# Patient Record
Sex: FEMALE | Race: WHITE | NOT HISPANIC OR LATINO | Employment: UNEMPLOYED | URBAN - NONMETROPOLITAN AREA
[De-identification: names, ages, dates, MRNs, and addresses within clinical notes are randomized per-mention and may not be internally consistent; named-entity substitution may affect disease eponyms.]

---

## 2023-03-22 DIAGNOSIS — E78.5 HYPERLIPIDEMIA, UNSPECIFIED: ICD-10-CM

## 2023-03-24 RX ORDER — ROSUVASTATIN CALCIUM 5 MG/1
5 TABLET, COATED ORAL NIGHTLY
Qty: 90 TABLET | Refills: 1 | Status: SHIPPED | OUTPATIENT
Start: 2023-03-24 | End: 2023-07-11 | Stop reason: SDUPTHER

## 2023-03-24 RX ORDER — ROSUVASTATIN CALCIUM 5 MG/1
TABLET, COATED ORAL
Qty: 90 TABLET | Refills: 3 | Status: SHIPPED | OUTPATIENT
Start: 2023-03-24 | End: 2023-04-12 | Stop reason: SDUPTHER

## 2023-03-24 RX ORDER — ROSUVASTATIN CALCIUM 5 MG/1
5 TABLET, COATED ORAL NIGHTLY
COMMUNITY
End: 2023-03-24 | Stop reason: SDUPTHER

## 2023-04-11 ASSESSMENT — ENCOUNTER SYMPTOMS
SEIZURES: 0
WEIGHT LOSS: 0
FATIGUE: 0
SPEECH DIFFICULTY: 0
HUNGER: 0
BLURRED VISION: 0
POLYPHAGIA: 0
DIZZINESS: 0
HEADACHES: 0
POLYDIPSIA: 0
WEAKNESS: 0
VISUAL CHANGE: 1
CONFUSION: 0
BLACKOUTS: 0
TREMORS: 0
SWEATS: 0

## 2023-04-12 ENCOUNTER — OFFICE VISIT (OUTPATIENT)
Dept: PRIMARY CARE | Facility: CLINIC | Age: 63
End: 2023-04-12
Payer: COMMERCIAL

## 2023-04-12 VITALS
WEIGHT: 160.6 LBS | DIASTOLIC BLOOD PRESSURE: 80 MMHG | SYSTOLIC BLOOD PRESSURE: 144 MMHG | BODY MASS INDEX: 28.46 KG/M2 | TEMPERATURE: 97.2 F | HEIGHT: 63 IN | OXYGEN SATURATION: 94 % | RESPIRATION RATE: 18 BRPM | HEART RATE: 82 BPM

## 2023-04-12 DIAGNOSIS — Z00.00 HEALTH CARE MAINTENANCE: ICD-10-CM

## 2023-04-12 DIAGNOSIS — E11.69 TYPE 2 DIABETES MELLITUS WITH OTHER SPECIFIED COMPLICATION, WITH LONG-TERM CURRENT USE OF INSULIN (MULTI): ICD-10-CM

## 2023-04-12 DIAGNOSIS — I10 PRIMARY HYPERTENSION: ICD-10-CM

## 2023-04-12 DIAGNOSIS — Z85.828 HISTORY OF BASAL CELL CARCINOMA: ICD-10-CM

## 2023-04-12 DIAGNOSIS — E78.2 MODERATE MIXED HYPERLIPIDEMIA NOT REQUIRING STATIN THERAPY: Primary | ICD-10-CM

## 2023-04-12 DIAGNOSIS — Z79.4 TYPE 2 DIABETES MELLITUS WITH OTHER SPECIFIED COMPLICATION, WITH LONG-TERM CURRENT USE OF INSULIN (MULTI): ICD-10-CM

## 2023-04-12 PROBLEM — L98.9 LESION OF SKIN OF SCALP: Status: ACTIVE | Noted: 2023-04-12

## 2023-04-12 PROBLEM — L98.9 LESION OF SKIN OF SCALP: Status: RESOLVED | Noted: 2023-04-12 | Resolved: 2023-04-12

## 2023-04-12 PROBLEM — E78.5 HYPERLIPIDEMIA: Status: ACTIVE | Noted: 2023-04-12

## 2023-04-12 PROBLEM — M75.112 INCOMPLETE TEAR OF LEFT ROTATOR CUFF: Status: RESOLVED | Noted: 2023-04-12 | Resolved: 2023-04-12

## 2023-04-12 PROBLEM — R79.89 ABNORMAL TSH: Status: ACTIVE | Noted: 2023-04-12

## 2023-04-12 PROBLEM — J40 BRONCHITIS: Status: ACTIVE | Noted: 2023-04-12

## 2023-04-12 PROBLEM — L25.9 CONTACT DERMATITIS: Status: ACTIVE | Noted: 2023-04-12

## 2023-04-12 PROBLEM — L57.0 ACTINIC KERATOSES: Status: RESOLVED | Noted: 2023-04-12 | Resolved: 2023-04-12

## 2023-04-12 PROBLEM — T88.7XXA MEDICATION SIDE EFFECT: Status: ACTIVE | Noted: 2023-04-12

## 2023-04-12 PROBLEM — L57.0 ACTINIC KERATOSES: Status: ACTIVE | Noted: 2023-04-12

## 2023-04-12 PROBLEM — R21 RASH: Status: ACTIVE | Noted: 2023-04-12

## 2023-04-12 PROBLEM — R05.9 COUGH: Status: ACTIVE | Noted: 2023-04-12
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
## 2023-04-12 PROBLEM — E11.9 DMII (DIABETES MELLITUS, TYPE 2) (MULTI): Status: ACTIVE | Noted: 2023-04-12

## 2023-04-12 PROBLEM — F43.20 ADJUSTMENT REACTION OF ADULT LIFE: Status: RESOLVED | Noted: 2023-04-12 | Resolved: 2023-04-12

## 2023-04-12 PROBLEM — T88.7XXA MEDICATION SIDE EFFECT: Status: RESOLVED | Noted: 2023-04-12 | Resolved: 2023-04-12

## 2023-04-12 PROBLEM — R05.9 COUGH: Status: RESOLVED | Noted: 2023-04-12 | Resolved: 2023-04-12

## 2023-04-12 PROBLEM — F43.20 ADJUSTMENT REACTION OF ADULT LIFE: Status: ACTIVE | Noted: 2023-04-12

## 2023-04-12 PROBLEM — S49.92XD: Status: ACTIVE | Noted: 2023-04-12

## 2023-04-12 PROBLEM — R21 RASH: Status: RESOLVED | Noted: 2023-04-12 | Resolved: 2023-04-12

## 2023-04-12 PROBLEM — L25.9 CONTACT DERMATITIS: Status: RESOLVED | Noted: 2023-04-12 | Resolved: 2023-04-12

## 2023-04-12 PROBLEM — J40 BRONCHITIS: Status: RESOLVED | Noted: 2023-04-12 | Resolved: 2023-04-12

## 2023-04-12 PROBLEM — S49.92XD: Status: RESOLVED | Noted: 2023-04-12 | Resolved: 2023-04-12

## 2023-04-12 PROBLEM — M75.112 INCOMPLETE TEAR OF LEFT ROTATOR CUFF: Status: ACTIVE | Noted: 2023-04-12

## 2023-04-12 PROCEDURE — 83036 HEMOGLOBIN GLYCOSYLATED A1C: CPT

## 2023-04-12 PROCEDURE — 1036F TOBACCO NON-USER: CPT | Performed by: INTERNAL MEDICINE

## 2023-04-12 PROCEDURE — 99214 OFFICE O/P EST MOD 30 MIN: CPT | Performed by: INTERNAL MEDICINE

## 2023-04-12 PROCEDURE — 3077F SYST BP >= 140 MM HG: CPT | Performed by: INTERNAL MEDICINE

## 2023-04-12 PROCEDURE — 85025 COMPLETE CBC W/AUTO DIFF WBC: CPT

## 2023-04-12 PROCEDURE — 36415 COLL VENOUS BLD VENIPUNCTURE: CPT | Performed by: INTERNAL MEDICINE

## 2023-04-12 PROCEDURE — 80053 COMPREHEN METABOLIC PANEL: CPT

## 2023-04-12 PROCEDURE — 3079F DIAST BP 80-89 MM HG: CPT | Performed by: INTERNAL MEDICINE

## 2023-04-12 PROCEDURE — 80061 LIPID PANEL: CPT

## 2023-04-12 RX ORDER — LANCETS 33 GAUGE
EACH MISCELLANEOUS
COMMUNITY
Start: 2023-02-05 | End: 2023-10-02 | Stop reason: SDUPTHER

## 2023-04-12 RX ORDER — ISOPROPYL ALCOHOL 70 ML/100ML
SWAB TOPICAL
COMMUNITY
Start: 2021-01-12 | End: 2024-04-19 | Stop reason: ALTCHOICE

## 2023-04-12 RX ORDER — INSULIN HUMAN 100 [IU]/ML
INJECTION, SUSPENSION SUBCUTANEOUS 3 TIMES DAILY
COMMUNITY
Start: 2021-01-12 | End: 2023-10-03

## 2023-04-12 RX ORDER — EPINEPHRINE 0.22MG
AEROSOL WITH ADAPTER (ML) INHALATION
COMMUNITY
Start: 2020-07-23

## 2023-04-12 RX ORDER — FLASH GLUCOSE SENSOR
KIT MISCELLANEOUS
COMMUNITY
Start: 2023-04-04 | End: 2023-10-02 | Stop reason: SDUPTHER

## 2023-04-12 RX ORDER — INSULIN HUMAN 100 [IU]/ML
INJECTION, SUSPENSION SUBCUTANEOUS
COMMUNITY
End: 2023-10-02 | Stop reason: SDUPTHER

## 2023-04-12 RX ORDER — INSULIN GLARGINE 100 [IU]/ML
INJECTION, SOLUTION SUBCUTANEOUS
COMMUNITY
End: 2023-10-02 | Stop reason: SDUPTHER

## 2023-04-12 RX ORDER — CARVEDILOL 6.25 MG/1
1 TABLET ORAL
COMMUNITY
End: 2023-10-03 | Stop reason: SDUPTHER

## 2023-04-12 NOTE — ASSESSMENT & PLAN NOTE
- c/w carvedilol 6.25 mg BID  - had cardiology Jim Leggett, however unable to afford as patient has high deductible  - currently off aspirin

## 2023-04-12 NOTE — ASSESSMENT & PLAN NOTE
- c/w Humulin 70/30, taking 15-20 units BID, but advised to increase up to 2 units (up to 22 units)  on the biggest meal of the day (in the evening), as she   - c/w Lantus 24 units qhs  - follow up A1C  - last seen opthalmology in 11/2021, advised to follow up  - foot exam unremarkable at visit 4/12/23

## 2023-04-12 NOTE — PROGRESS NOTES
Subjective   Patient ID:   Leonie Wilkinson is a 62 y.o. female, history of DM II, HTN, HLD who presents for Med Refill (Wants A1c )    DM II  - on CGM (Free style Agustin)  - on Humulin 70/30, taking 15 to 20 units TID depending on the sugar, take 1 hour before meal  7 am, 11:30 and 4:30 pm  - on Lantus 24 units in the evening  - A1C at 8.3% on 9/2021  - last seen opthalmology in 10/2021, need to follow up , noted there is a growth on the eye   - foot exam unremarkable at last visit 2/1/21  - f/u Lynn Gil, last seen 4/2022  - unable to tolerate GLP-1 analog  - followed by Lynn Gil; as per Dr. Richardson, her BMI is 23 an concerning with insulin deficient state however have c-peptide of 1.2    HTN  - on carvedilol 6.25 mg BID  - used to see cardiology Jim Leggett, but unable to follow up secondary to high insurance deductible, switched insurance in 2022   - s/p stress test - 4/2021, was unremarkable, patient with CT cardiac scoring of intermediate; score of 257  - patient with gum bleeding with baby aspirin, currently not asa     HLD  - on Crestor 5 mg qd     Basal cell carcinoma, frontal head  - s/p removal   - currently due for follow up   - last seen 11/2022, follow up in 6 months from there     All other (10) organ systems have been reviewed, negative for significant complaint and no change from baseline other than mentioned as per HPI above.    Patient Active Problem List   Diagnosis    Abnormal TSH    Hyperlipidemia    Hypertension    DMII (diabetes mellitus, type 2) (CMS/[facility])    History of basal cell carcinoma    Health care maintenance        Past Surgical History:   Procedure Laterality Date    MOUTH SURGERY  06/20/2014    Oral Surgery Tooth Extraction    OTHER SURGICAL HISTORY  02/25/2019    Biceps tenodesis procedure    TONSILLECTOMY  06/20/2014    Tonsillectomy       Family History   Problem Relation Name Age of Onset    Stroke Mother      Other (cardiac disorder) Father          Social History    reports that she has never smoked. She has never used smokeless tobacco. She reports current drug use. Drug: Marijuana. No history on file for alcohol use.    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 Sig Documenting Provider Last Dose Status   alcohol swabs (Alcohol Prep Pads) pads, medicated [id number] Yes to be used to inject insulin daily Historical Provider, MD  Active   carvedilol (Coreg) 6.25 mg tablet [id number] Yes Take 1 tablet (6.25 mg) by mouth in the morning and 1 tablet (6.25 mg) in the evening. Take with meals. Historical Provider, MD  Active   coenzyme Q-10 100 mg capsule [id number] Yes Take by mouth. Historical Provider, MD  Active   FreeStyle Agustin 2 Sensor kit [id number] Yes Change sensor every 14 days as directed Historical MD Kaitlin  Active   HumuLIN 70/30 U-100 KwikPen 100 unit/mL (70-30) injection [id number] Yes INJECT 15 UNIT 3 times daily Historical Provider, MD  Active   insulin NPH, Isophane, (HumuLIN N NPH U-100 Insulin) 100 unit/mL injection [id number] Yes Inject under the skin 3 times a day. Historical Provider, MD  Active   Lantus Solostar U-100 Insulin 100 unit/mL (3 mL) pen [id number] Yes INJECT 24 UNITS SUBCUTANEOUSLY AT BEDTIME Historical Provider, MD  Active   NON FORMULARY [id number] Yes Take by mouth. PROBIATA ORAL Historical Provider, MD  Active     Discontinued 04/12/23 1406   rosuvastatin (Crestor) 5 mg tablet [id number] Yes Take 1 tablet (5 mg) by  "mouth once daily at bedtime. Clarke Melendrez MD  Active   Unifine Pentips Plus 32 gauge x 5/32\" needle [id number] Yes inject 6 (SIX) times a day Historical Provider, MD  Active                     Objective       5/11/2020     1:11 PM 7/23/2020     9:27 AM 2/1/2021    12:08 PM 7/8/2021     8:34 AM 3/9/2022     8:02 AM 8/8/2022     9:37 AM 4/12/2023    11:25 AM   Vitals   Systolic  132 142 130 128 144 144   Diastolic  82 72 72 78 88 80   Heart Rate  89 94 88 91 73 82   Temp 36.7 °C (98.1 °F) 36.2 °C (97.2 °F) 35.9 °C (96.6 °F) 35.8 °C (96.5 °F) 35.6 °C (96 °F) 36.6 °C (97.8 °F) 36.2 °C (97.2 °F)   Resp 20 18 18 18 18  18   Height (in) 1.6 m (5' 3\") 1.6 m (5' 3\") 1.6 m (5' 3\") 1.6 m (5' 3\")  1.6 m (5' 3\") 1.6 m (5' 3\")   Weight (lb)  131 139 148 158 160 160.6   BMI  23.21 kg/m2 24.62 kg/m2 26.22 kg/m2 27.99 kg/m2 28.34 kg/m2 28.45 kg/m2   BSA (m2)  1.62 m2 1.67 m2 1.73 m2 1.79 m2 1.8 m2 1.8 m2   Visit Report       Report     Physical Exam  Constitutional:       General: She is not in acute distress.     Appearance: Normal appearance. She is not ill-appearing or toxic-appearing.   HENT:      Head: Normocephalic.   Eyes:      Extraocular Movements: Extraocular movements intact.      Conjunctiva/sclera: Conjunctivae normal.   Cardiovascular:      Rate and Rhythm: Normal rate and regular rhythm.      Pulses:           Dorsalis pedis pulses are 2+ on the right side and 2+ on the left side.      Heart sounds: Normal heart sounds. No murmur heard.     No friction rub. No gallop.   Pulmonary:      Effort: Pulmonary effort is normal. No respiratory distress.      Breath sounds: Normal breath sounds. No stridor. No wheezing, rhonchi or rales.   Abdominal:      General: Abdomen is flat. There is no distension.      Palpations: Abdomen is soft.      Tenderness: There is no abdominal tenderness. There is no guarding.   Musculoskeletal:         General: Normal range of motion.      Right foot: Normal range of motion. No deformity. "      Left foot: Normal range of motion. No deformity.   Feet:      Right foot:      Protective Sensation: 10 sites tested.  10 sites sensed.      Skin integrity: Skin integrity normal. No ulcer, blister, skin breakdown, erythema or warmth.      Left foot:      Protective Sensation: 10 sites tested.  10 sites sensed.      Skin integrity: Skin integrity normal. No ulcer, blister, skin breakdown, erythema or warmth.      Comments: Diabetic foot exam, good bilateral dorsalis pedis pulse, no lesion, sensory 10/10 on monofilament exam bilaterally    Skin:     General: Skin is warm and dry.   Neurological:      General: No focal deficit present.      Mental Status: She is alert. Mental status is at baseline.         Assessment/Plan     Leonie Wilkinson is a 62 y.o. female, history of DM II, HTN, HLD who presents for Med Refill (Wants A1c )    Problem List Items Addressed This Visit       Hyperlipidemia - Primary     - c/w Crestor 5 mg qd          Relevant Orders    Lipid Panel    Hypertension     - c/w carvedilol 6.25 mg BID  - had cardiology Jim Leggett, however unable to afford as patient has high deductible  - currently off aspirin         Relevant Orders    Comprehensive Metabolic Panel    CBC and Auto Differential    DMII (diabetes mellitus, type 2) (CMS/[facility])     - c/w Humulin 70/30, taking 15-20 units BID, but advised to increase up to 2 units (up to 22 units)  on the biggest meal of the day (in the evening), as she   - c/w Lantus 24 units qhs  - A1C slighty worsening as above, c/w current medication, - f/u Lynn Gil  - last seen opthalmology in 11/2021, advised to follow up  - foot exam unremarkable at visit 4/12/23         Relevant Orders    Hemoglobin A1c    History of basal cell carcinoma     - advised to follow up with [facility] maintenance     3/9/22  Aspirin for primary/secondary prophylaxis: stopped as patient with gum bleeding  Diabetes Screening/monitoring: A1C as  above  Lipid screening: odered  STD/HIV Screening: declined   Last Mammogram: last in 4/2021, ordered on prior visit , patient wanted to wait until 10/1/23, as currently patient is self pay   Last Pap smear: advised to follow up with gynecologist , last pap smear was more than 5 years ago   Lung Cancer Screening: never smoke, not indicated  Hepatitis C Screening: negative 2/2021  DEXA Scan : not due yet   Colonoscopy: last 11/2018, found polyp (4 polyps removed), next due 11/2021, ordered on prior visit, however patient would like to wait until 10/23 as patient will get medicare  Vaccination s/p 3x COVID, advised to be bivalent booster, s/p 2x shingles, last Tdap 2015, s/p PPSV23, out of season for flu          Follow up in 3 to 6 months pending A1C result   No future appointments.

## 2023-04-12 NOTE — ASSESSMENT & PLAN NOTE
3/9/22  Aspirin for primary/secondary prophylaxis: stopped as patient with gum bleeding  Diabetes Screening/monitoring: A1C as above  Lipid screening: odered  STD/HIV Screening: declined   Last Mammogram: last in 4/2021, ordered on prior visit , patient wanted to wait until 10/1/23, as currently patient is self pay   Last Pap smear: advised to follow up with gynecologist , last pap smear was more than 5 years ago   Lung Cancer Screening: never smoke, not indicated  Hepatitis C Screening: negative 2/2021  DEXA Scan : not due yet   Colonoscopy: last 11/2018, found polyp (4 polyps removed), next due 11/2021, ordered on prior visit, however patient would like to wait until 10/23 as patient will get medicare  Vaccination s/p 3x COVID, advised to be bivalent booster, s/p 2x shingles, last Tdap 2015, s/p PPSV23, out of season for flu

## 2023-04-13 LAB
ALANINE AMINOTRANSFERASE (SGPT) (U/L) IN SER/PLAS: 19 U/L (ref 7–45)
ALBUMIN (G/DL) IN SER/PLAS: 4.6 G/DL (ref 3.4–5)
ALKALINE PHOSPHATASE (U/L) IN SER/PLAS: 61 U/L (ref 33–136)
ANION GAP IN SER/PLAS: 12 MMOL/L (ref 10–20)
ASPARTATE AMINOTRANSFERASE (SGOT) (U/L) IN SER/PLAS: 15 U/L (ref 9–39)
BASOPHILS (10*3/UL) IN BLOOD BY AUTOMATED COUNT: 0.05 X10E9/L (ref 0–0.1)
BASOPHILS/100 LEUKOCYTES IN BLOOD BY AUTOMATED COUNT: 0.6 % (ref 0–2)
BILIRUBIN TOTAL (MG/DL) IN SER/PLAS: 0.7 MG/DL (ref 0–1.2)
CALCIUM (MG/DL) IN SER/PLAS: 10.2 MG/DL (ref 8.6–10.6)
CARBON DIOXIDE, TOTAL (MMOL/L) IN SER/PLAS: 28 MMOL/L (ref 21–32)
CHLORIDE (MMOL/L) IN SER/PLAS: 102 MMOL/L (ref 98–107)
CHOLESTEROL (MG/DL) IN SER/PLAS: 170 MG/DL (ref 0–199)
CHOLESTEROL IN HDL (MG/DL) IN SER/PLAS: 47.8 MG/DL
CHOLESTEROL/HDL RATIO: 3.6
CREATININE (MG/DL) IN SER/PLAS: 0.74 MG/DL (ref 0.5–1.05)
EOSINOPHILS (10*3/UL) IN BLOOD BY AUTOMATED COUNT: 0.12 X10E9/L (ref 0–0.7)
EOSINOPHILS/100 LEUKOCYTES IN BLOOD BY AUTOMATED COUNT: 1.5 % (ref 0–6)
ERYTHROCYTE DISTRIBUTION WIDTH (RATIO) BY AUTOMATED COUNT: 13.2 % (ref 11.5–14.5)
ERYTHROCYTE MEAN CORPUSCULAR HEMOGLOBIN CONCENTRATION (G/DL) BY AUTOMATED: 31.9 G/DL (ref 32–36)
ERYTHROCYTE MEAN CORPUSCULAR VOLUME (FL) BY AUTOMATED COUNT: 88 FL (ref 80–100)
ERYTHROCYTES (10*6/UL) IN BLOOD BY AUTOMATED COUNT: 5.21 X10E12/L (ref 4–5.2)
ESTIMATED AVERAGE GLUCOSE FOR HBA1C: 169 MG/DL
GFR FEMALE: 90 ML/MIN/1.73M2
GLUCOSE (MG/DL) IN SER/PLAS: 153 MG/DL (ref 74–99)
HEMATOCRIT (%) IN BLOOD BY AUTOMATED COUNT: 45.7 % (ref 36–46)
HEMOGLOBIN (G/DL) IN BLOOD: 14.6 G/DL (ref 12–16)
HEMOGLOBIN A1C/HEMOGLOBIN TOTAL IN BLOOD: 7.5 %
IMMATURE GRANULOCYTES/100 LEUKOCYTES IN BLOOD BY AUTOMATED COUNT: 0.2 % (ref 0–0.9)
LDL: 76 MG/DL (ref 0–99)
LEUKOCYTES (10*3/UL) IN BLOOD BY AUTOMATED COUNT: 8.2 X10E9/L (ref 4.4–11.3)
LYMPHOCYTES (10*3/UL) IN BLOOD BY AUTOMATED COUNT: 2.67 X10E9/L (ref 1.2–4.8)
LYMPHOCYTES/100 LEUKOCYTES IN BLOOD BY AUTOMATED COUNT: 32.8 % (ref 13–44)
MONOCYTES (10*3/UL) IN BLOOD BY AUTOMATED COUNT: 0.42 X10E9/L (ref 0.1–1)
MONOCYTES/100 LEUKOCYTES IN BLOOD BY AUTOMATED COUNT: 5.2 % (ref 2–10)
NEUTROPHILS (10*3/UL) IN BLOOD BY AUTOMATED COUNT: 4.87 X10E9/L (ref 1.2–7.7)
NEUTROPHILS/100 LEUKOCYTES IN BLOOD BY AUTOMATED COUNT: 59.7 % (ref 40–80)
NON HDL CHOLESTEROL: 122 MG/DL
PLATELETS (10*3/UL) IN BLOOD AUTOMATED COUNT: 212 X10E9/L (ref 150–450)
POTASSIUM (MMOL/L) IN SER/PLAS: 4.3 MMOL/L (ref 3.5–5.3)
PROTEIN TOTAL: 7.8 G/DL (ref 6.4–8.2)
SODIUM (MMOL/L) IN SER/PLAS: 138 MMOL/L (ref 136–145)
TRIGLYCERIDE (MG/DL) IN SER/PLAS: 229 MG/DL (ref 0–149)
UREA NITROGEN (MG/DL) IN SER/PLAS: 9 MG/DL (ref 6–23)
VLDL: 46 MG/DL (ref 0–40)

## 2023-07-11 DIAGNOSIS — E78.5 HYPERLIPIDEMIA, UNSPECIFIED: ICD-10-CM

## 2023-07-16 RX ORDER — ROSUVASTATIN CALCIUM 5 MG/1
5 TABLET, COATED ORAL NIGHTLY
Qty: 90 TABLET | Refills: 3 | Status: SHIPPED | OUTPATIENT
Start: 2023-07-16 | End: 2023-10-03 | Stop reason: SDUPTHER

## 2023-10-02 DIAGNOSIS — E11.649 UNCONTROLLED TYPE 2 DIABETES MELLITUS WITH HYPOGLYCEMIA WITHOUT COMA (MULTI): Primary | ICD-10-CM

## 2023-10-02 RX ORDER — INSULIN HUMAN 100 [IU]/ML
INJECTION, SUSPENSION SUBCUTANEOUS
Qty: 3 ML | Refills: 11 | Status: SHIPPED | OUTPATIENT
Start: 2023-10-02 | End: 2024-02-15 | Stop reason: SDUPTHER

## 2023-10-02 RX ORDER — INSULIN GLARGINE 100 [IU]/ML
INJECTION, SOLUTION SUBCUTANEOUS
Qty: 3 ML | Refills: 11 | Status: SHIPPED | OUTPATIENT
Start: 2023-10-02

## 2023-10-02 RX ORDER — BLOOD SUGAR DIAGNOSTIC
1 STRIP MISCELLANEOUS AS NEEDED
Qty: 50 STRIP | Refills: 3 | Status: SHIPPED | OUTPATIENT
Start: 2023-10-02

## 2023-10-02 RX ORDER — BLOOD SUGAR DIAGNOSTIC
1 STRIP MISCELLANEOUS AS NEEDED
COMMUNITY
End: 2023-10-02 | Stop reason: SDUPTHER

## 2023-10-02 RX ORDER — FLASH GLUCOSE SENSOR
KIT MISCELLANEOUS
Qty: 1 EACH | Refills: 11 | Status: SHIPPED | OUTPATIENT
Start: 2023-10-02

## 2023-10-02 RX ORDER — LANCETS 33 GAUGE
EACH MISCELLANEOUS
Qty: 180 EACH | Refills: 11 | Status: SHIPPED | OUTPATIENT
Start: 2023-10-02

## 2023-10-02 NOTE — TELEPHONE ENCOUNTER
Spoke to Leonie Wilkinson  Scheduled patient for yearly follow up 12/07/2023. Patient requested refills . Refill request setup and pended to physician. Karon Hightower LPN

## 2023-10-02 NOTE — TELEPHONE ENCOUNTER
Spoke to Leonie Wilkinson concerning new insurance and scheduling yearly follow up for 12/07/2023 . Patient requesting refills of Humulin 70/30 , sheldon sensor ,lantus and pen needles. Requesting new script for Freestyle sheldon test strips for when sensors malfunction . Refill request send and pended to physician .  Karon Hightower LPN

## 2023-10-03 ENCOUNTER — OFFICE VISIT (OUTPATIENT)
Dept: PRIMARY CARE | Facility: CLINIC | Age: 63
End: 2023-10-03
Payer: MEDICARE

## 2023-10-03 VITALS
TEMPERATURE: 96.1 F | SYSTOLIC BLOOD PRESSURE: 172 MMHG | DIASTOLIC BLOOD PRESSURE: 92 MMHG | HEART RATE: 82 BPM | HEIGHT: 62 IN | WEIGHT: 158 LBS | BODY MASS INDEX: 29.08 KG/M2 | OXYGEN SATURATION: 95 %

## 2023-10-03 DIAGNOSIS — E78.5 HYPERLIPIDEMIA, UNSPECIFIED: ICD-10-CM

## 2023-10-03 DIAGNOSIS — Z23 NEED FOR VACCINATION: ICD-10-CM

## 2023-10-03 DIAGNOSIS — Z12.11 SCREENING FOR COLORECTAL CANCER: ICD-10-CM

## 2023-10-03 DIAGNOSIS — Z12.12 SCREENING FOR COLORECTAL CANCER: ICD-10-CM

## 2023-10-03 DIAGNOSIS — Z12.31 ENCOUNTER FOR SCREENING MAMMOGRAM FOR BREAST CANCER: ICD-10-CM

## 2023-10-03 DIAGNOSIS — I10 PRIMARY HYPERTENSION: ICD-10-CM

## 2023-10-03 DIAGNOSIS — Z00.00 ROUTINE GENERAL MEDICAL EXAMINATION AT HEALTH CARE FACILITY: Primary | ICD-10-CM

## 2023-10-03 PROCEDURE — 3080F DIAST BP >= 90 MM HG: CPT | Performed by: FAMILY MEDICINE

## 2023-10-03 PROCEDURE — 3077F SYST BP >= 140 MM HG: CPT | Performed by: FAMILY MEDICINE

## 2023-10-03 PROCEDURE — 1036F TOBACCO NON-USER: CPT | Performed by: FAMILY MEDICINE

## 2023-10-03 PROCEDURE — 3051F HG A1C>EQUAL 7.0%<8.0%: CPT | Performed by: FAMILY MEDICINE

## 2023-10-03 PROCEDURE — G0008 ADMIN INFLUENZA VIRUS VAC: HCPCS | Performed by: FAMILY MEDICINE

## 2023-10-03 PROCEDURE — G0402 INITIAL PREVENTIVE EXAM: HCPCS | Performed by: FAMILY MEDICINE

## 2023-10-03 PROCEDURE — 90662 IIV NO PRSV INCREASED AG IM: CPT | Performed by: FAMILY MEDICINE

## 2023-10-03 RX ORDER — CARVEDILOL 6.25 MG/1
6.25 TABLET ORAL
Qty: 180 TABLET | Refills: 3 | Status: SHIPPED | OUTPATIENT
Start: 2023-10-03

## 2023-10-03 RX ORDER — BIOTIN 10 MG
TABLET ORAL
COMMUNITY

## 2023-10-03 RX ORDER — ROSUVASTATIN CALCIUM 5 MG/1
5 TABLET, COATED ORAL NIGHTLY
Qty: 90 TABLET | Refills: 3 | Status: SHIPPED | OUTPATIENT
Start: 2023-10-03 | End: 2024-10-02

## 2023-10-03 ASSESSMENT — ENCOUNTER SYMPTOMS
APPETITE CHANGE: 0
COUGH: 0
HEMATURIA: 0
DEPRESSION: 0
ARTHRALGIAS: 0
DIARRHEA: 0
SHORTNESS OF BREATH: 0
SLEEP DISTURBANCE: 0
MYALGIAS: 0
EYE ITCHING: 0
POLYPHAGIA: 1
FLANK PAIN: 0
JOINT SWELLING: 0
FEVER: 0
RHINORRHEA: 0
BLOOD IN STOOL: 0
PALPITATIONS: 0
DIZZINESS: 0
HEADACHES: 0
SORE THROAT: 0
CONSTIPATION: 0
OCCASIONAL FEELINGS OF UNSTEADINESS: 0
DYSURIA: 0
LOSS OF SENSATION IN FEET: 0
NUMBNESS: 0
VOMITING: 0
UNEXPECTED WEIGHT CHANGE: 0
LIGHT-HEADEDNESS: 0
WEAKNESS: 0
EYE DISCHARGE: 0
WHEEZING: 0
ACTIVITY CHANGE: 0
DYSPHORIC MOOD: 0
ABDOMINAL PAIN: 0
NAUSEA: 0
SINUS PRESSURE: 0
NERVOUS/ANXIOUS: 0

## 2023-10-03 ASSESSMENT — ACTIVITIES OF DAILY LIVING (ADL)
GROCERY_SHOPPING: INDEPENDENT
TAKING_MEDICATION: INDEPENDENT
DOING_HOUSEWORK: INDEPENDENT
BATHING: INDEPENDENT
MANAGING_FINANCES: INDEPENDENT
DRESSING: INDEPENDENT

## 2023-10-03 ASSESSMENT — VISUAL ACUITY
OS_CC: 20/20
OD_CC: 20/15

## 2023-10-03 ASSESSMENT — PATIENT HEALTH QUESTIONNAIRE - PHQ9
2. FEELING DOWN, DEPRESSED OR HOPELESS: NOT AT ALL
SUM OF ALL RESPONSES TO PHQ9 QUESTIONS 1 AND 2: 0
1. LITTLE INTEREST OR PLEASURE IN DOING THINGS: NOT AT ALL

## 2023-10-03 NOTE — PROGRESS NOTES
"Subjective   Reason for Visit: Leonie Wilkinson is an 62 y.o. female here for a Medicare Wellness visit.     Past Medical, Surgical, and Family History reviewed and updated in chart.    Reviewed all medications by prescribing practitioner or clinical pharmacist (such as prescriptions, OTCs, herbal therapies and supplements) and documented in the medical record.    HPI HERE FOR HER WELLNESS   WOULD LIKE NOT TO TAKE THE CRESTOR   ADVISED TAKING IT   DISCUSS WITH HER CARDIOLOGIST   SHE SEES ENDOCRINE     Patient Care Team:  Daysi Davidsno DO as PCP - General (Family Medicine)     Review of Systems   Constitutional:  Negative for activity change, appetite change, fever and unexpected weight change.   HENT:  Negative for congestion, ear pain, postnasal drip, rhinorrhea, sinus pressure and sore throat.    Eyes:  Negative for discharge, itching and visual disturbance.   Respiratory:  Negative for cough, shortness of breath and wheezing.    Cardiovascular:  Negative for chest pain, palpitations and leg swelling.   Gastrointestinal:  Negative for abdominal pain, blood in stool, constipation, diarrhea, nausea and vomiting.   Endocrine: Positive for polyphagia. Negative for cold intolerance, heat intolerance and polyuria.   Genitourinary:  Negative for dysuria, flank pain and hematuria.   Musculoskeletal:  Negative for arthralgias, gait problem, joint swelling and myalgias.   Skin:  Negative for rash.   Allergic/Immunologic: Negative for environmental allergies and food allergies.   Neurological:  Negative for dizziness, syncope, weakness, light-headedness, numbness and headaches.   Psychiatric/Behavioral:  Negative for dysphoric mood and sleep disturbance. The patient is not nervous/anxious.        Objective   Vitals:  BP (!) 172/92   Pulse 82   Temp 35.6 °C (96.1 °F)   Ht 1.581 m (5' 2.25\")   Wt 71.7 kg (158 lb)   SpO2 95%   BMI 28.67 kg/m²       Physical Exam  Vitals and nursing note reviewed.   Constitutional:  "      Appearance: Normal appearance.   HENT:      Head: Normocephalic.      Mouth/Throat:      Mouth: Mucous membranes are moist.   Cardiovascular:      Rate and Rhythm: Normal rate and regular rhythm.      Pulses: Normal pulses.      Heart sounds: Normal heart sounds. No murmur heard.     No friction rub. No gallop.   Pulmonary:      Effort: Pulmonary effort is normal. No respiratory distress.      Breath sounds: Normal breath sounds. No wheezing.   Abdominal:      General: Bowel sounds are normal. There is no distension.      Palpations: Abdomen is soft.      Tenderness: There is no abdominal tenderness.   Musculoskeletal:         General: No deformity. Normal range of motion.        Feet:    Feet:      Right foot:      Skin integrity: Callus present.   Skin:     General: Skin is warm and dry.      Capillary Refill: Capillary refill takes less than 2 seconds.   Neurological:      General: No focal deficit present.      Mental Status: She is alert and oriented to person, place, and time.   Psychiatric:         Mood and Affect: Mood normal.         Assessment/Plan   Problem List Items Addressed This Visit       Hypertension    Relevant Medications    carvedilol (Coreg) 6.25 mg tablet     Other Visit Diagnoses       Routine general medical examination at health care facility    -  Primary    Need for vaccination        Relevant Orders    Flu vaccine, high dose seasonal, preservative free    Encounter for screening mammogram for breast cancer        Relevant Orders    BI mammo bilateral screening tomosynthesis    Screening for colorectal cancer        Relevant Orders    Referral to Gastroenterology    Hyperlipidemia, unspecified        Relevant Medications    rosuvastatin (Crestor) 5 mg tablet

## 2023-10-06 ENCOUNTER — TELEPHONE (OUTPATIENT)
Dept: SURGERY | Facility: CLINIC | Age: 63
End: 2023-10-06
Payer: COMMERCIAL

## 2023-10-07 DIAGNOSIS — Z12.11 SCREEN FOR COLON CANCER: Primary | ICD-10-CM

## 2023-10-09 ENCOUNTER — TELEPHONE (OUTPATIENT)
Dept: ENDOCRINOLOGY | Facility: CLINIC | Age: 63
End: 2023-10-09
Payer: COMMERCIAL

## 2023-10-09 NOTE — TELEPHONE ENCOUNTER
Spoke with Leonie Wilkinson verifying her insurance for prior authorization . Patient verified new insurance . Patient waiting to find out from pharmacy if CGM is a medical benefit to be supplied by a DME or a pharmacy benefit . Call made to pharmacy and message left for pharmacist working on CGM coverage to call office . Karon Hightower LPN

## 2023-11-09 ENCOUNTER — CLINICAL SUPPORT (OUTPATIENT)
Dept: PRIMARY CARE | Facility: CLINIC | Age: 63
End: 2023-11-09
Payer: MEDICARE

## 2023-11-09 DIAGNOSIS — I10 HYPERTENSION, UNSPECIFIED TYPE: ICD-10-CM

## 2023-11-09 DIAGNOSIS — E78.5 HYPERLIPIDEMIA, UNSPECIFIED HYPERLIPIDEMIA TYPE: ICD-10-CM

## 2023-11-09 DIAGNOSIS — E11.69 TYPE 2 DIABETES MELLITUS WITH OTHER SPECIFIED COMPLICATION, WITH LONG-TERM CURRENT USE OF INSULIN (MULTI): ICD-10-CM

## 2023-11-09 DIAGNOSIS — Z79.4 TYPE 2 DIABETES MELLITUS WITH OTHER SPECIFIED COMPLICATION, WITH LONG-TERM CURRENT USE OF INSULIN (MULTI): ICD-10-CM

## 2023-11-09 PROCEDURE — 80061 LIPID PANEL: CPT

## 2023-11-09 PROCEDURE — 83036 HEMOGLOBIN GLYCOSYLATED A1C: CPT

## 2023-11-09 PROCEDURE — 80053 COMPREHEN METABOLIC PANEL: CPT

## 2023-11-09 PROCEDURE — 36415 COLL VENOUS BLD VENIPUNCTURE: CPT

## 2023-11-10 ENCOUNTER — HOSPITAL ENCOUNTER (OUTPATIENT)
Dept: RADIOLOGY | Facility: HOSPITAL | Age: 63
Discharge: HOME | End: 2023-11-10
Payer: MEDICARE

## 2023-11-10 DIAGNOSIS — Z12.31 ENCOUNTER FOR SCREENING MAMMOGRAM FOR BREAST CANCER: ICD-10-CM

## 2023-11-10 LAB
ALBUMIN SERPL BCP-MCNC: 4.6 G/DL (ref 3.4–5)
ALP SERPL-CCNC: 55 U/L (ref 33–136)
ALT SERPL W P-5'-P-CCNC: 13 U/L (ref 7–45)
ANION GAP SERPL CALC-SCNC: 14 MMOL/L (ref 10–20)
AST SERPL W P-5'-P-CCNC: 15 U/L (ref 9–39)
BILIRUB SERPL-MCNC: 0.6 MG/DL (ref 0–1.2)
BUN SERPL-MCNC: 8 MG/DL (ref 6–23)
CALCIUM SERPL-MCNC: 9.6 MG/DL (ref 8.6–10.6)
CHLORIDE SERPL-SCNC: 101 MMOL/L (ref 98–107)
CHOLEST SERPL-MCNC: 175 MG/DL (ref 0–199)
CHOLESTEROL/HDL RATIO: 4
CO2 SERPL-SCNC: 26 MMOL/L (ref 21–32)
CREAT SERPL-MCNC: 0.71 MG/DL (ref 0.5–1.05)
EST. AVERAGE GLUCOSE BLD GHB EST-MCNC: 163 MG/DL
GFR SERPL CREATININE-BSD FRML MDRD: >90 ML/MIN/1.73M*2
GLUCOSE SERPL-MCNC: 128 MG/DL (ref 74–99)
HBA1C MFR BLD: 7.3 %
HDLC SERPL-MCNC: 43.6 MG/DL
LDLC SERPL CALC-MCNC: 96 MG/DL
NON HDL CHOLESTEROL: 131 MG/DL (ref 0–149)
POTASSIUM SERPL-SCNC: 4.7 MMOL/L (ref 3.5–5.3)
PROT SERPL-MCNC: 8 G/DL (ref 6.4–8.2)
SODIUM SERPL-SCNC: 136 MMOL/L (ref 136–145)
TRIGL SERPL-MCNC: 178 MG/DL (ref 0–149)
VLDL: 36 MG/DL (ref 0–40)

## 2023-11-10 PROCEDURE — 77067 SCR MAMMO BI INCL CAD: CPT

## 2023-11-10 PROCEDURE — 77063 BREAST TOMOSYNTHESIS BI: CPT | Mod: BILATERAL PROCEDURE | Performed by: RADIOLOGY

## 2023-11-10 PROCEDURE — 77067 SCR MAMMO BI INCL CAD: CPT | Mod: BILATERAL PROCEDURE | Performed by: RADIOLOGY

## 2023-11-13 ENCOUNTER — PROCEDURE VISIT (OUTPATIENT)
Dept: PRIMARY CARE | Facility: CLINIC | Age: 63
End: 2023-11-13
Payer: MEDICARE

## 2023-11-13 VITALS
HEIGHT: 62 IN | BODY MASS INDEX: 30 KG/M2 | HEART RATE: 68 BPM | WEIGHT: 163 LBS | DIASTOLIC BLOOD PRESSURE: 92 MMHG | TEMPERATURE: 96.3 F | OXYGEN SATURATION: 96 % | SYSTOLIC BLOOD PRESSURE: 150 MMHG

## 2023-11-13 DIAGNOSIS — Z12.4 ENCOUNTER FOR PAPANICOLAOU SMEAR FOR CERVICAL CANCER SCREENING: ICD-10-CM

## 2023-11-13 PROCEDURE — 99397 PER PM REEVAL EST PAT 65+ YR: CPT | Performed by: FAMILY MEDICINE

## 2023-11-13 PROCEDURE — 88175 CYTOPATH C/V AUTO FLUID REDO: CPT

## 2023-11-13 PROCEDURE — 87624 HPV HI-RISK TYP POOLED RSLT: CPT

## 2023-11-13 ASSESSMENT — ENCOUNTER SYMPTOMS
NUMBNESS: 0
HEADACHES: 0
ABDOMINAL PAIN: 0
BLOOD IN STOOL: 0
JOINT SWELLING: 0
DIZZINESS: 0
COUGH: 0
VOMITING: 0
NERVOUS/ANXIOUS: 0
FEVER: 0
WEAKNESS: 0
ARTHRALGIAS: 0
EYE ITCHING: 0
DYSPHORIC MOOD: 0
RHINORRHEA: 0
APPETITE CHANGE: 0
SINUS PRESSURE: 0
CONSTIPATION: 0
MYALGIAS: 0
HEMATURIA: 0
NAUSEA: 0
FLANK PAIN: 0
LIGHT-HEADEDNESS: 0
SLEEP DISTURBANCE: 0
PALPITATIONS: 0
WHEEZING: 0
SHORTNESS OF BREATH: 0
DIARRHEA: 0
ACTIVITY CHANGE: 0
UNEXPECTED WEIGHT CHANGE: 0
EYE DISCHARGE: 0
SORE THROAT: 0
DYSURIA: 0

## 2023-11-13 NOTE — PATIENT INSTRUCTIONS
PAP IS TAKEN   THE OFFICE WILL CALL WITH THE RESULTS /MY CHART   FOLLOW WITH ENDOCRINE  6 MONTHS FOR  A LIPID PANEL AND GENERAL CHEMISTRIES   CONTINUE GREAT EFFORTS WITH LOWER FAT AND SUGARS AND CARBOHYDRATES IN THE DIET   DAILY EXERCISE

## 2023-11-13 NOTE — PROGRESS NOTES
"Subjective   Patient ID: Leonie Wilkinson is a 63 y.o. female who presents for Gynecologic Exam (Has not had a PAP in \"a long time\".).    Gynecologic Exam  Pertinent negatives include no abdominal pain, constipation, diarrhea, dysuria, fever, flank pain, headaches, hematuria, nausea, rash, sore throat or vomiting.    NO BLEEDING OR SPOTTING   MENOPAUSAL AGE AGE 50  NULLIPAROUS     Review of Systems   Constitutional:  Negative for activity change, appetite change, fever and unexpected weight change.   HENT:  Negative for congestion, ear pain, postnasal drip, rhinorrhea, sinus pressure and sore throat.    Eyes:  Negative for discharge, itching and visual disturbance.   Respiratory:  Negative for cough, shortness of breath and wheezing.    Cardiovascular:  Negative for chest pain, palpitations and leg swelling.   Gastrointestinal:  Negative for abdominal pain, blood in stool, constipation, diarrhea, nausea and vomiting.   Endocrine: Negative for cold intolerance, heat intolerance and polyuria.   Genitourinary:  Negative for dysuria, flank pain and hematuria.   Musculoskeletal:  Negative for arthralgias, gait problem, joint swelling and myalgias.   Skin:  Negative for rash.   Allergic/Immunologic: Negative for environmental allergies and food allergies.   Neurological:  Negative for dizziness, syncope, weakness, light-headedness, numbness and headaches.   Psychiatric/Behavioral:  Negative for dysphoric mood and sleep disturbance. The patient is not nervous/anxious.        Objective   BP (!) 150/92   Pulse 68   Temp 35.7 °C (96.3 °F)   Ht 1.581 m (5' 2.25\")   Wt 73.9 kg (163 lb)   SpO2 96%   BMI 29.57 kg/m²     Physical Exam  Constitutional:       Appearance: Normal appearance. She is obese.   HENT:      Head: Normocephalic.   Cardiovascular:      Rate and Rhythm: Normal rate and regular rhythm.   Pulmonary:      Effort: Pulmonary effort is normal.      Breath sounds: Normal breath sounds.   Abdominal:      General: " Bowel sounds are normal.      Palpations: Abdomen is soft. There is no mass.      Tenderness: There is no abdominal tenderness. There is no right CVA tenderness or left CVA tenderness.      Comments: PROTUBERANT    Genitourinary:     General: Normal vulva.      Vagina: No vaginal discharge.      Rectum: Normal.      Comments: UTERUS SMALL AND NEGATIVE ADNEXA   Skin:     General: Skin is warm and dry.   Neurological:      General: No focal deficit present.      Mental Status: She is alert and oriented to person, place, and time.   Psychiatric:         Mood and Affect: Mood normal.         Assessment/Plan   Diagnoses and all orders for this visit:  Encounter for Papanicolaou smear for cervical cancer screening  -     THINPREP PAP TEST

## 2023-11-29 LAB
CYTOLOGY CMNT CVX/VAG CYTO-IMP: NORMAL
HPV HR 12 DNA GENITAL QL NAA+PROBE: NEGATIVE
HPV HR GENOTYPES PNL CVX NAA+PROBE: NEGATIVE
HPV16 DNA SPEC QL NAA+PROBE: NEGATIVE
HPV18 DNA SPEC QL NAA+PROBE: NEGATIVE
LAB AP HPV GENOTYPE QUESTION: YES
LAB AP HPV HR: NORMAL
LABORATORY COMMENT REPORT: NORMAL
PATH REPORT.TOTAL CANCER: NORMAL

## 2023-12-01 ENCOUNTER — TELEPHONE (OUTPATIENT)
Dept: PRIMARY CARE | Facility: CLINIC | Age: 63
End: 2023-12-01
Payer: MEDICARE

## 2023-12-01 NOTE — TELEPHONE ENCOUNTER
----- Message from Daysi Davidson DO sent at 11/30/2023 12:23 PM EST -----  This pap is normal. Repeat in 3-5 years

## 2023-12-07 ENCOUNTER — TELEMEDICINE (OUTPATIENT)
Dept: ENDOCRINOLOGY | Facility: CLINIC | Age: 63
End: 2023-12-07
Payer: MEDICARE

## 2023-12-07 DIAGNOSIS — E11.65 UNCONTROLLED TYPE 2 DIABETES MELLITUS WITH HYPERGLYCEMIA (MULTI): Primary | ICD-10-CM

## 2023-12-07 PROCEDURE — 95251 CONT GLUC MNTR ANALYSIS I&R: CPT | Performed by: INTERNAL MEDICINE

## 2023-12-07 PROCEDURE — 99214 OFFICE O/P EST MOD 30 MIN: CPT | Performed by: INTERNAL MEDICINE

## 2023-12-07 NOTE — PROGRESS NOTES
Consults    Reason For Consult  Diabetes mellitus     History Of Present Illness  Leonie Wilkinson is a 63 y.o. female presenting with diabetes .      She is getting ophthalmology this year   Did not have insurance for 2-3 years      type 2 diabetes. date of diagnosis: 2012.   Home Glucose Monitoring:   Frequency of Testing:   Compliant with home glucose monitoring.     No recent hypoglycemic episodes.   Diet Plan:   Patient is compliant with diet plan.    Diabetes Surveillance: Eye exam: 10/2020.   Diabetes Complications: Diabetic cataract.         Past Medical History  Problems    · DMII (diabetes mellitus, type 2) (250.00) (E11.9)   · Hyperlipidemia (272.4) (E78.5)    Surgical History  Problems    · History of Biceps tenodesis procedure   · History of Oral Surgery Tooth Extraction   · History of Tonsillectomy    Family History  Mother    · Family history of stroke (V17.1) (Z82.3)  Father    · Family history of cardiac disorder (V17.49) (Z82.49)    Social History  Problems    · Caffeine use (V49.89) (Z78.9)   · Drinks caffeinated tea   · Minimum alcohol consumption   · Never a smoker   · Single    Allergies  Medication    · Amoxicillin TABS  Rash; Updated By: Destiny Méndez; 7/24/2015 4:28:03 PM   · Bactrim DS TABS  Rash; Updated By: Destiny Méndez; 7/24/2015 4:28:03 PM   · Dilantin CAPS  Rash; Updated By: Destiny Méndez; 7/24/2015 4:28:03 PM   · Janumet TABS  Gatrointestinal upset; Recorded By: [email or web address] Sinai Valles; 7/23/2020   10:02:37 AM   · TEGretol TABS  Rash; Updated By: Destiny Méndez; 7/24/2015 4:28:02 PM   · Farxiga TABS  Recorded By: Lynn Richardson; 1/27/2021 10:39:54 AM    Home Management    Results from Most Recent A1C  Hemoglobin A1C   Date/Time Value Ref Range Status   11/09/2023 09:00 AM 7.3 (H) see below % Final        Diabetes Problem List Entries with Dates  Problem List:  2023-04: DMII (diabetes mellitus, type 2) (CMS/[facility])      History of DKA with Dates:   This is not  able to automated, and will cause the clinician to review the entire history of patient. Solved, need to look at History of Diabetes Problem List. Includes resolved entries. Clinician can look above and enter the count.      History where DKA was Reason for Admission in last year none        Insulin administered via shot      NOTE: Anything under this line is for testing purposes only       Past Medical History  She has a past medical history of Actinic keratoses (04/12/2023), Arthritis (01/2013), Cataract (01/2018), Heart disease (01/01/2021), Hyperlipidemia, unspecified (03/09/2022), Hypertension (01/01:2013), Seizures (CMS/[facility]) (06/01/1991), Type 2 diabetes mellitus without complications (CMS/[facility]) (08/08/2022), and Varicella (01/01/1973 and 01/01/1979).    Surgical History  She has a past surgical history that includes Tonsillectomy (06/20/2014); Mouth surgery (06/20/2014); and Other surgical history (02/25/2019).     Social History  She reports that she has never smoked. She has never used smokeless tobacco. She reports that she does not currently use alcohol. She reports current drug use. Drug: Marijuana.    Family History  Family History   Problem Relation Name Age of Onset    Stroke Mother Olena Wilkinson     Heart disease Mother Olena Wilkinson     Hypertension Mother Olena Wilkinson     Other (cardiac disorder) Father Pancho Wilkinson     Heart disease Father Pancho Wilkinson     Vision loss Paternal Grandmother Ana Reich [place]     Arthritis Mother's Sister Ijeoma Negrete         Allergies  Dapagliflozin, Dapagliflozin propanediol, Sitagliptin phos-metformin, Amoxicillin, Carbamazepine, Phenytoin, and Sulfamethoxazole-trimethoprim    Review of Systems    Past Medical History:   Diagnosis Date    Actinic keratoses 04/12/2023    Arthritis 01/2013    Cataract 01/2018    Heart disease 01/01/2021    Hyperlipidemia, unspecified 03/09/2022    Hyperlipidemia    Hypertension 01/01:2013    Seizures (CMS/[facility])  06/01/1991    Type 2 diabetes mellitus without complications (CMS/[facility]) 08/08/2022    DMII (diabetes mellitus, type 2)    Varicella 01/01/1973 and 01/01/1979       Past Surgical History:   Procedure Laterality Date    MOUTH SURGERY  06/20/2014    Oral Surgery Tooth Extraction    OTHER SURGICAL HISTORY  02/25/2019    Biceps tenodesis procedure    TONSILLECTOMY  06/20/2014    Tonsillectomy       Social History     Socioeconomic History    Marital status: Single     Spouse name: Not on file    Number of children: Not on file    Years of education: Not on file    Highest education level: Not on file   Occupational History    Not on file   Tobacco Use    Smoking status: Never    Smokeless tobacco: Never   Vaping Use    Vaping Use: Every day    Substances: CBD   Substance and Sexual Activity    Alcohol use: Not Currently    Drug use: Yes     Types: Marijuana    Sexual activity: Not Currently   Other Topics Concern    Not on file   Social History Narrative    Not on file     Social Determinants of Health     Financial Resource Strain: Not on file   Food Insecurity: Not on file   Transportation Needs: Not on file   Physical Activity: Not on file   Stress: Not on file   Social Connections: Not on file   Intimate Partner Violence: Not on file   Housing Stability: Not on file        Physical Exam     ROS, PMH, FH/SH, surgical history and allergies have been reviewed.    Last Recorded Vitals  There were no vitals taken for this visit.    Relevant Results         If you would like to pull in Medications, type .meds     If you would like to pull in Lab results for the last 24 hours, type .[id number]    If you would like to pull in specific Lab results, type .ll     If you would like to pull in Imaging results, type .imgrslt :99}  Lab Review  Lab Results   Component Value Date    BILITOT 0.6 11/09/2023    CALCIUM 9.6 11/09/2023    CO2 26 11/09/2023     11/09/2023    CREATININE 0.71 11/09/2023    GLUCOSE 128 (H) 11/09/2023     ALKPHOS 55 11/09/2023    K 4.7 11/09/2023    PROT 8.0 11/09/2023     11/09/2023    AST 15 11/09/2023    ALT 13 11/09/2023    BUN 8 11/09/2023    ANIONGAP 14 11/09/2023    MG 2.31 01/11/2021    PHOS 2.3 (L) 01/12/2021    ALBUMIN 4.6 11/09/2023    GFRF 90 04/12/2023     Lab Results   Component Value Date    TRIG 178 (H) 11/09/2023    CHOL 175 11/09/2023    LDLCALC 96 11/09/2023    HDL 43.6 11/09/2023     Lab Results   Component Value Date    HGBA1C 7.3 (H) 11/09/2023    HGBA1C 7.5 (A) 04/12/2023    HGBA1C 7.5 (A) 03/15/2022     The 10-year ASCVD risk score (David MIRANDA, et al., 2019) is: 14.4%    Values used to calculate the score:      Age: 63 years      Sex: Female      Is Non- : No      Diabetic: Yes      Tobacco smoker: No      Systolic Blood Pressure: 150 mmHg      Is BP treated: No      HDL Cholesterol: 43.6 mg/dL      Total Cholesterol: 175 mg/dL       Assessment/Plan   Problem List Items Addressed This Visit    None  Visit Diagnoses         Codes    Uncontrolled type 2 diabetes mellitus with hyperglycemia (CMS/HCC)    -  Primary E11.65                   I talked to the patient, I reviewed the blood test  reviewed her CGM t    we will switch her to    70/30 mix insulin 15-18units in the morning and 18 - 22 units lunch continue Lantus insulin 18-24  units in the afternoon    Colonoscopy , hold 70/30 that morning , can resume 70/30 after the colonoscopy   The days of cleansing 10 units 70/30 units .    at night once low BG last month   Cgm has to be done     She could not tolerate GLP-1 analogs  And she was recently hospitalized with elevated blood sugars, almost diabetic ketoacidosis    Hyperlipidemia managed with Crestor     she stopped the glipizide for her diabetes, I am going to continue this for now  she is using 4 injections a day     ophthalmology  needs a schedule , she had PCP foot exam 2/2021     albumin / creat  3/2022    already vaccinated   shingles vaccine done     We  will see her in 6 months     Dr. Lynn Richardson  Professor of Medicine,[employer]  Director, [facility]  , system operations             I spent a total of 30+ minutes on the date of the service which included preparing to see the patient, face-to-face patient care, completing clinical documentation, obtaining and / or reviewing separately obtained history, counseling and educating the patient/family/caregiver, ordering medications, tests, or procedures, communicating with other healthcare providers (not separately reported), independently interpreting results, not separately reported, and communicating results to the patient/family/caregiver, real-time telemedicine visit using audiovisual technology with patient's verbal consent.  Name and date of birth verified.      Lynn Richardson MD

## 2023-12-08 ENCOUNTER — TELEPHONE (OUTPATIENT)
Dept: ENDOCRINOLOGY | Facility: CLINIC | Age: 63
End: 2023-12-08
Payer: MEDICARE

## 2023-12-08 NOTE — TELEPHONE ENCOUNTER
Contacted [facility] to confirm fax number for Clinical Notes request. Clinical notes in order for patient to receive CGM faxed to [phone]  as directed . Karon Hightower LPN

## 2023-12-16 ASSESSMENT — DERMATOLOGY QUALITY OF LIFE (QOL) ASSESSMENT
RATE HOW BOTHERED YOU ARE BY EFFECTS OF YOUR SKIN PROBLEMS ON YOUR ACTIVITIES (EG, GOING OUT, ACCOMPLISHING WHAT YOU WANT, WORK ACTIVITIES OR YOUR RELATIONSHIPS WITH OTHERS): 0 - NEVER BOTHERED
WHAT SINGLE SKIN CONDITION LISTED BELOW IS THE PATIENT ANSWERING THE QUALITY-OF-LIFE ASSESSMENT QUESTIONS ABOUT: NONE OF THE ABOVE
RATE HOW BOTHERED YOU ARE BY SYMPTOMS OF YOUR SKIN PROBLEM (EG, ITCHING, STINGING BURNING, HURTING OR SKIN IRRITATION): 0 - NEVER BOTHERED
RATE HOW EMOTIONALLY BOTHERED YOU ARE BY YOUR SKIN PROBLEM (FOR EXAMPLE, WORRY, EMBARRASSMENT, FRUSTRATION): 0 - NEVER BOTHERED
RATE HOW EMOTIONALLY BOTHERED YOU ARE BY YOUR SKIN PROBLEM (FOR EXAMPLE, WORRY, EMBARRASSMENT, FRUSTRATION): 0 - NEVER BOTHERED
RATE HOW BOTHERED YOU ARE BY EFFECTS OF YOUR SKIN PROBLEMS ON YOUR ACTIVITIES (EG, GOING OUT, ACCOMPLISHING WHAT YOU WANT, WORK ACTIVITIES OR YOUR RELATIONSHIPS WITH OTHERS): 0 - NEVER BOTHERED
RATE HOW BOTHERED YOU ARE BY SYMPTOMS OF YOUR SKIN PROBLEM (EG, ITCHING, STINGING BURNING, HURTING OR SKIN IRRITATION): 0 - NEVER BOTHERED
WHAT SINGLE SKIN CONDITION LISTED BELOW IS THE PATIENT ANSWERING THE QUALITY-OF-LIFE ASSESSMENT QUESTIONS ABOUT: NONE OF THE ABOVE

## 2023-12-16 ASSESSMENT — PATIENT GLOBAL ASSESSMENT (PGA): WHAT IS THE PGA: PATIENT GLOBAL ASSESSMENT:  1 - CLEAR

## 2023-12-20 ENCOUNTER — APPOINTMENT (OUTPATIENT)
Dept: DERMATOLOGY | Facility: CLINIC | Age: 63
End: 2023-12-20
Payer: MEDICARE

## 2024-02-15 DIAGNOSIS — E11.649 UNCONTROLLED TYPE 2 DIABETES MELLITUS WITH HYPOGLYCEMIA WITHOUT COMA (MULTI): ICD-10-CM

## 2024-02-15 RX ORDER — INSULIN HUMAN 100 [IU]/ML
INJECTION, SUSPENSION SUBCUTANEOUS
Qty: 3 ML | Refills: 11 | Status: SHIPPED | OUTPATIENT
Start: 2024-02-15

## 2024-02-15 NOTE — TELEPHONE ENCOUNTER
Patient is requesting an increase in insulin dose on her prescription for Humulin 70/30 insulin. Patient presently take 15-20 units daily as discussed with provider but script is written for 15 units and she will run out prior to refill being available . New script pended to provider to review and consider. Karon Hightower LPN

## 2024-04-09 ASSESSMENT — DERMATOLOGY QUALITY OF LIFE (QOL) ASSESSMENT
RATE HOW EMOTIONALLY BOTHERED YOU ARE BY YOUR SKIN PROBLEM (FOR EXAMPLE, WORRY, EMBARRASSMENT, FRUSTRATION): 3
RATE HOW BOTHERED YOU ARE BY EFFECTS OF YOUR SKIN PROBLEMS ON YOUR ACTIVITIES (EG, GOING OUT, ACCOMPLISHING WHAT YOU WANT, WORK ACTIVITIES OR YOUR RELATIONSHIPS WITH OTHERS): 3
RATE HOW BOTHERED YOU ARE BY SYMPTOMS OF YOUR SKIN PROBLEM (EG, ITCHING, STINGING BURNING, HURTING OR SKIN IRRITATION): 0 - NEVER BOTHERED
RATE HOW BOTHERED YOU ARE BY EFFECTS OF YOUR SKIN PROBLEMS ON YOUR ACTIVITIES (EG, GOING OUT, ACCOMPLISHING WHAT YOU WANT, WORK ACTIVITIES OR YOUR RELATIONSHIPS WITH OTHERS): 3
WHAT SINGLE SKIN CONDITION LISTED BELOW IS THE PATIENT ANSWERING THE QUALITY-OF-LIFE ASSESSMENT QUESTIONS ABOUT: NONE OF THE ABOVE
WHAT SINGLE SKIN CONDITION LISTED BELOW IS THE PATIENT ANSWERING THE QUALITY-OF-LIFE ASSESSMENT QUESTIONS ABOUT: NONE OF THE ABOVE
DATE THE QUALITY-OF-LIFE ASSESSMENT WAS COMPLETED: 66939
RATE HOW EMOTIONALLY BOTHERED YOU ARE BY YOUR SKIN PROBLEM (FOR EXAMPLE, WORRY, EMBARRASSMENT, FRUSTRATION): 3
RATE HOW BOTHERED YOU ARE BY SYMPTOMS OF YOUR SKIN PROBLEM (EG, ITCHING, STINGING BURNING, HURTING OR SKIN IRRITATION): 0 - NEVER BOTHERED

## 2024-04-10 ENCOUNTER — OFFICE VISIT (OUTPATIENT)
Dept: DERMATOLOGY | Facility: CLINIC | Age: 64
End: 2024-04-10
Payer: MEDICARE

## 2024-04-10 DIAGNOSIS — D48.5 NEOPLASM OF UNCERTAIN BEHAVIOR OF SKIN: Primary | ICD-10-CM

## 2024-04-10 DIAGNOSIS — D18.01 ANGIOMA OF SKIN: ICD-10-CM

## 2024-04-10 DIAGNOSIS — Z85.828 HISTORY OF NONMELANOMA SKIN CANCER: ICD-10-CM

## 2024-04-10 DIAGNOSIS — L57.9 SKIN CHANGES DUE TO CHRONIC EXPOSURE TO NONIONIZING RADIATION: ICD-10-CM

## 2024-04-10 DIAGNOSIS — D22.9 BENIGN NEVUS: ICD-10-CM

## 2024-04-10 DIAGNOSIS — L82.1 SEBORRHEIC KERATOSIS: ICD-10-CM

## 2024-04-10 DIAGNOSIS — L81.4 LENTIGO: ICD-10-CM

## 2024-04-10 DIAGNOSIS — L90.5 SCAR CONDITIONS AND FIBROSIS OF SKIN: ICD-10-CM

## 2024-04-10 PROCEDURE — 1160F RVW MEDS BY RX/DR IN RCRD: CPT | Performed by: NURSE PRACTITIONER

## 2024-04-10 PROCEDURE — 99213 OFFICE O/P EST LOW 20 MIN: CPT | Performed by: NURSE PRACTITIONER

## 2024-04-10 PROCEDURE — 1036F TOBACCO NON-USER: CPT | Performed by: NURSE PRACTITIONER

## 2024-04-10 PROCEDURE — 1159F MED LIST DOCD IN RCRD: CPT | Performed by: NURSE PRACTITIONER

## 2024-04-10 PROCEDURE — 88305 TISSUE EXAM BY PATHOLOGIST: CPT | Performed by: DERMATOLOGY

## 2024-04-10 PROCEDURE — 11102 TANGNTL BX SKIN SINGLE LES: CPT | Performed by: NURSE PRACTITIONER

## 2024-04-10 NOTE — PROGRESS NOTES
Subjective     Leonie Wilkinson is a 63 y.o. female who presents for the following: Skin Check (6 month follow up).     Review of Systems:  No other skin or systemic complaints other than what is documented elsewhere in the note.    The following portions of the chart were reviewed this encounter and updated as appropriate:   Tobacco  Allergies  Meds  Problems  Med Hx  Surg Hx         Skin Cancer History  No skin cancer on file.      Specialty Problems          Dermatology Problems    History of basal cell carcinoma        Objective   Well appearing patient in no apparent distress; mood and affect are within normal limits.    A full examination was performed including scalp, head, eyes, ears, nose, lips, neck, chest, axillae, abdomen, back, buttocks, bilateral upper extremities, bilateral lower extremities, hands, feet, fingers, toes, fingernails, and toenails. All findings within normal limits unless otherwise noted below.      Assessment/Plan   1. Neoplasm of uncertain behavior of skin  Mid Parietal Scalp  13 mm erythematous thick crusty papule          Lesion biopsy  Type of biopsy: tangential    Informed consent: discussed and consent obtained    Timeout: patient name, date of birth, surgical site, and procedure verified    Procedure prep:  Patient was prepped and draped  Anesthesia: the lesion was anesthetized in a standard fashion    Anesthetic:  1% lidocaine plain local infiltration  Instrument used: DermaBlade    Hemostasis achieved with: electrodesiccation    Outcome: patient tolerated procedure well    Post-procedure details: wound care instructions given    Additional details:  Cleaned area with isopropyl alcohol prior to anesthesia or biopsy. Applied thin layer of vaseline and covered with bandaid after procedure      Staff Communication: Dermatology Local Anesthesia: 1 % Plain Lidocaine - Amount:0.5ml    Specimen 1 - Dermatopathology- DERM LAB  Differential Diagnosis: NMSC vs ISK  Check Margins  Yes/No?:    Comments:    Dermpath Lab: Routine Histopathology (formalin-fixed tissue)    NUB  - Given uncertainty in clinical diagnosis, shave biopsy is recommended in clinic today.  - The patient expressed understanding, is in agreement with this plan, and wishes to proceed with biopsy.  - Oral and written wound care instructions provided.  - Advised patient that the office will call within 2 weeks to discuss biopsy results.      Related Procedures  Follow Up In Dermatology - Established Patient    2. Angioma of skin  Scattered cherry-red papule(s).    A cherry hemangioma is a small macule (small, flat, smooth area) or papule (small, solid bump) formed from an overgrowth of tiny blood vessels in the skin. Cherry hemangiomas are characteristically red or purplish in color. They often first appear in middle adulthood and usually increase in number with age. Cherry hemangiomas are noncancerous (benign) and are common in adults.    The present appearance of the lesion is not worrisome but it should continue to be observed and testing/treatment may be warranted if change occurs.    Related Procedures  Follow Up In Dermatology - Established Patient    3. Benign nevus  Scattered, uniform and benign-appearing, regular brown melanocytic papules and macules.    1. Mid upper abdomen has a 6.5 x 6.5 mm dark brown macule reticular pattern some what varigated in color  2. Left abdomen medial nevus is 7 x 9.5 mm tan brown broken reticular homogenous pattern  3. Left abdomen lateral nevus is 5.5 x 10 mmsimilar sweeney brown broken reticular homogenous pattern  4. Left lateral lower back has a 3.5 x 8 mm similar tan brown broken reticular homogenoous pattern   5. Right lateral lower back has a 3.5 x 10 mmsimilar sweeney brown broken reticular homogenouspattern.  6. Right glabella area has a 5.5 x 4 mm pink atrophic scar     The present appearance of the lesions are not worrisome but it should continue to be observed and testing/treatment  may be warranted if change occurs.    Lesions being monitored are stable.     Related Procedures  Follow Up In Dermatology - Established Patient    4. Seborrheic keratosis  Stuck on verrucous, tan-brown papules and plaques.      Seborrheic keratoses are common noncancerous (benign) growths of unknown cause seen in adults due to a thickening of an area of the top skin layer. Seborrheic keratoses may appear as if they are stuck on to the skin. They have distinct borders, and they may appear as papules (small, solid bumps) or plaques (solid, raised patches that are bigger than a thumbnail). They may be the same color as your skin, or they may be pink, light brown, darker brown, or very dark brown, or sometimes may appear black.    There is no way to prevent new seborrheic keratoses from forming. Seborrheic keratoses can be removed, but removal is considered a cosmetic issue and is usually not covered by insurance.    PLAN  No treatment is needed unless there is irritation from clothing, such as itching or bleeding.  2.   Some lotions containing alpha hydroxy acids, salicylic acid, or urea may make the areas feel smoother with regular use but will not eliminate them.    Related Procedures  Follow Up In Dermatology - Established Patient    5. Lentigo  Scattered tan macules in sun-exposed areas.    A solar lentigo (plural, solar lentigines), also known as a sun-induced freckle or senile lentigo, is a dark (hyperpigmented) lesion caused by natural or artificial ultraviolet (UV) light. Solar lentigines may be single or multiple. This type of lentigo is different from a simple lentigo (lentigo simplex) because it is caused by exposure to UV light. Solar lentigines are benign, but they do indicate excessive sun exposure, a risk factor for the development of skin cancer.    To prevent solar lentigines, avoid exposure to sunlight in midday (10 AM to 3 PM), wear sun-protective clothing (tightly woven clothes and hats), and apply  sunscreen (SPF 30 UVA and UVB block).    The present appearance of the lesion is not worrisome but it should continue to be observed and testing/treatment may be warranted if change occurs.    Related Procedures  Follow Up In Dermatology - Established Patient    6. Scar conditions and fibrosis of skin  Mid Forehead  Well healed scar at the site(s) of prior treatment with no evidence of recurrence.          The scar is clear, there is no evidence of recurrence.  The present appearance of the scar is not worrisome but it should continue to be observed and testing/treatment may be warranted if change occurs.      Related Procedures  Follow Up In Dermatology - Established Patient    7. History of nonmelanoma skin cancer    ABCDEs of melanoma and atypical moles were discussed with the patient.    Patient was instructed to perform monthly self skin examination.  We recommended that the patient have regular full skin exams given an increased risk of subsequent skin cancers.    The patient was instructed to use sun protective behaviors including use of broad spectrum sunscreens and sun protective clothing to reduce risk of skin cancers.    Warning signs of non-melanoma skin cancer discussed.    Related Procedures  Follow Up In Dermatology - Established Patient    8. Skin changes due to chronic exposure to nonionizing radiation  Actinic changes in the form of freckles, lentigines and hyper/hypopigmentation     ABCDEs of melanoma and atypical moles were discussed with the patient.    Patient was instructed to perform monthly self skin examination.  We recommended that the patient have regular full skin exams given an increased risk of subsequent skin cancers.    The patient was instructed to use sun protective behaviors including use of broad spectrum sunscreens and sun protective clothing to reduce risk of skin cancers.    Warning signs of non-melanoma skin cancer discussed.    Related Procedures  Follow Up In Dermatology -  Established Patient          Return in 6 months for routine skin check or return to clinic sooner if needed

## 2024-04-10 NOTE — PATIENT INSTRUCTIONS

## 2024-04-12 LAB
LABORATORY COMMENT REPORT: NORMAL
PATH REPORT.FINAL DX SPEC: NORMAL
PATH REPORT.GROSS SPEC: NORMAL
PATH REPORT.MICROSCOPIC SPEC OTHER STN: NORMAL
PATH REPORT.RELEVANT HX SPEC: NORMAL
PATH REPORT.TOTAL CANCER: NORMAL

## 2024-04-16 ENCOUNTER — TELEPHONE (OUTPATIENT)
Dept: DERMATOLOGY | Facility: CLINIC | Age: 64
End: 2024-04-16
Payer: MEDICARE

## 2024-04-16 NOTE — TELEPHONE ENCOUNTER
Benign. No further treatment required. Return to clinic in 6 months for routine skin check or sooner if needed.

## 2024-04-19 ENCOUNTER — OFFICE VISIT (OUTPATIENT)
Dept: PRIMARY CARE | Facility: CLINIC | Age: 64
End: 2024-04-19
Payer: MEDICARE

## 2024-04-19 VITALS
OXYGEN SATURATION: 96 % | BODY MASS INDEX: 29.94 KG/M2 | HEART RATE: 80 BPM | WEIGHT: 165 LBS | DIASTOLIC BLOOD PRESSURE: 74 MMHG | TEMPERATURE: 96.9 F | SYSTOLIC BLOOD PRESSURE: 154 MMHG

## 2024-04-19 DIAGNOSIS — E11.69 TYPE 2 DIABETES MELLITUS WITH OTHER SPECIFIED COMPLICATION, WITH LONG-TERM CURRENT USE OF INSULIN (MULTI): ICD-10-CM

## 2024-04-19 DIAGNOSIS — Z79.4 TYPE 2 DIABETES MELLITUS WITH OTHER SPECIFIED COMPLICATION, WITH LONG-TERM CURRENT USE OF INSULIN (MULTI): ICD-10-CM

## 2024-04-19 DIAGNOSIS — H25.9 AGE-RELATED CATARACT OF BOTH EYES, UNSPECIFIED AGE-RELATED CATARACT TYPE: Primary | ICD-10-CM

## 2024-04-19 LAB — POC HEMOGLOBIN A1C: 7.4 % (ref 4.2–6.5)

## 2024-04-19 PROCEDURE — 1160F RVW MEDS BY RX/DR IN RCRD: CPT | Performed by: FAMILY MEDICINE

## 2024-04-19 PROCEDURE — 3078F DIAST BP <80 MM HG: CPT | Performed by: FAMILY MEDICINE

## 2024-04-19 PROCEDURE — 3077F SYST BP >= 140 MM HG: CPT | Performed by: FAMILY MEDICINE

## 2024-04-19 PROCEDURE — 1159F MED LIST DOCD IN RCRD: CPT | Performed by: FAMILY MEDICINE

## 2024-04-19 PROCEDURE — 1036F TOBACCO NON-USER: CPT | Performed by: FAMILY MEDICINE

## 2024-04-19 PROCEDURE — 99214 OFFICE O/P EST MOD 30 MIN: CPT | Performed by: FAMILY MEDICINE

## 2024-04-19 PROCEDURE — 83036 HEMOGLOBIN GLYCOSYLATED A1C: CPT | Performed by: FAMILY MEDICINE

## 2024-04-19 ASSESSMENT — ENCOUNTER SYMPTOMS
DIARRHEA: 0
DYSURIA: 0
ABDOMINAL PAIN: 0
VOMITING: 0
DYSPHORIC MOOD: 0
EYE ITCHING: 0
SLEEP DISTURBANCE: 0
FLANK PAIN: 0
RHINORRHEA: 0
CONSTIPATION: 0
ACTIVITY CHANGE: 0
APPETITE CHANGE: 0
UNEXPECTED WEIGHT CHANGE: 0
BLOOD IN STOOL: 0
FEVER: 0
WEAKNESS: 0
NUMBNESS: 0
SHORTNESS OF BREATH: 0
EYE DISCHARGE: 0
HEADACHES: 0
PALPITATIONS: 0
SINUS PRESSURE: 0
COUGH: 0
JOINT SWELLING: 0
DIZZINESS: 0
ARTHRALGIAS: 0
MYALGIAS: 0
LIGHT-HEADEDNESS: 0
NAUSEA: 0
NERVOUS/ANXIOUS: 0
WHEEZING: 0
SORE THROAT: 0
HEMATURIA: 0

## 2024-04-19 NOTE — PATIENT INSTRUCTIONS
DIABETES AND HER BLOOD PRESSURE ARE STABLE   SHE IS MEDICALLY CLEARED FOR THE PLANNED CATARACT SURGERIES     See in 3 months for A1c

## 2024-04-19 NOTE — PROGRESS NOTES
Subjective   Patient ID: Leonie Wilkinson is a 63 y.o. female who presents for Pre-op Exam (Only note needed- Dr. Ceja- scheduled 5/13 and 5/20 for cataract.).    HPI she is doing well   RECHECK B/P IS IMPROVED 140/80 (FORGOT MEDS THIS AM)  A1C IS  7.4 today    Review of Systems   Constitutional:  Negative for activity change, appetite change, fever and unexpected weight change.   HENT:  Negative for congestion, ear pain, postnasal drip, rhinorrhea, sinus pressure and sore throat.    Eyes:  Positive for visual disturbance. Negative for discharge and itching.        BILATERAL CATARACTS   NO NIGHT DRIVING    Respiratory:  Negative for cough, shortness of breath and wheezing.    Cardiovascular:  Negative for chest pain, palpitations and leg swelling.   Gastrointestinal:  Negative for abdominal pain, blood in stool, constipation, diarrhea, nausea and vomiting.   Endocrine: Negative for cold intolerance, heat intolerance and polyuria.        CONTROLLED  TYPE II DM ON INSULIN    Genitourinary:  Negative for dysuria, flank pain and hematuria.   Musculoskeletal:  Negative for arthralgias, gait problem, joint swelling and myalgias.   Skin:  Negative for rash.   Allergic/Immunologic: Negative for environmental allergies and food allergies.   Neurological:  Negative for dizziness, syncope, weakness, light-headedness, numbness and headaches.   Psychiatric/Behavioral:  Negative for dysphoric mood and sleep disturbance. The patient is not nervous/anxious.        Objective   /74   Pulse 80   Temp 36.1 °C (96.9 °F)   Wt 74.8 kg (165 lb)   SpO2 96%   BMI 29.94 kg/m²     Physical Exam  Vitals and nursing note reviewed.   Constitutional:       Appearance: Normal appearance.   HENT:      Head: Normocephalic.      Mouth/Throat:      Mouth: Mucous membranes are moist.   Eyes:      Comments: BILATERAL CATARACTS    Cardiovascular:      Rate and Rhythm: Normal rate and regular rhythm.      Pulses: Normal pulses.      Heart  sounds: Normal heart sounds. No murmur heard.     No friction rub. No gallop.   Pulmonary:      Effort: Pulmonary effort is normal. No respiratory distress.      Breath sounds: Normal breath sounds. No wheezing.   Abdominal:      General: Bowel sounds are normal. There is no distension.      Palpations: Abdomen is soft.      Tenderness: There is no abdominal tenderness.   Musculoskeletal:         General: No deformity. Normal range of motion.   Skin:     General: Skin is warm and dry.      Capillary Refill: Capillary refill takes less than 2 seconds.   Neurological:      General: No focal deficit present.      Mental Status: She is alert and oriented to person, place, and time.   Psychiatric:         Mood and Affect: Mood normal.       Assessment/Plan   Problem List Items Addressed This Visit             ICD-10-CM    DMII (diabetes mellitus, type 2) (Multi) E11.9    Relevant Orders    POCT glycosylated hemoglobin (Hb A1C) manually resulted     Other Visit Diagnoses         Codes    Age-related cataract of both eyes, unspecified age-related cataract type    -  Primary H25.9

## 2024-05-07 DIAGNOSIS — E11.9 TYPE 2 DIABETES MELLITUS WITHOUT COMPLICATION, UNSPECIFIED WHETHER LONG TERM INSULIN USE (MULTI): ICD-10-CM

## 2024-05-07 RX ORDER — PEN NEEDLE, DIABETIC 30 GX3/16"
NEEDLE, DISPOSABLE MISCELLANEOUS
Qty: 100 EACH | Refills: 3 | Status: SHIPPED | OUTPATIENT
Start: 2024-05-07

## 2024-05-07 RX ORDER — PEN NEEDLE, DIABETIC 30 GX3/16"
NEEDLE, DISPOSABLE MISCELLANEOUS
COMMUNITY
End: 2024-05-07 | Stop reason: SDUPTHER

## 2024-05-07 NOTE — TELEPHONE ENCOUNTER
Pharmacy requesting new script for pen needles , unifine is no longer covered by insurance . Order pended to provider. Karon Hightower LPN

## 2024-05-09 ENCOUNTER — TELEPHONE (OUTPATIENT)
Dept: ENDOCRINOLOGY | Facility: CLINIC | Age: 64
End: 2024-05-09
Payer: MEDICARE

## 2024-05-09 NOTE — TELEPHONE ENCOUNTER
Clinical notes sent by fax to [facility] at fax number [phone] as requested . Karon Hightower LPN      
I will SWITCH the dose or number of times a day I take the medications listed below when I get home from the hospital:  None

## 2024-07-01 DIAGNOSIS — E78.5 HYPERLIPIDEMIA, UNSPECIFIED: ICD-10-CM

## 2024-07-01 DIAGNOSIS — I10 PRIMARY HYPERTENSION: ICD-10-CM

## 2024-07-08 RX ORDER — CARVEDILOL 6.25 MG/1
6.25 TABLET ORAL
Qty: 180 TABLET | Refills: 3 | Status: SHIPPED | OUTPATIENT
Start: 2024-07-08

## 2024-07-08 RX ORDER — ROSUVASTATIN CALCIUM 5 MG/1
5 TABLET, COATED ORAL NIGHTLY
Qty: 90 TABLET | Refills: 3 | Status: SHIPPED | OUTPATIENT
Start: 2024-07-08 | End: 2025-07-08

## 2024-08-02 ENCOUNTER — APPOINTMENT (OUTPATIENT)
Dept: PRIMARY CARE | Facility: CLINIC | Age: 64
End: 2024-08-02
Payer: MEDICARE

## 2024-08-05 ENCOUNTER — APPOINTMENT (OUTPATIENT)
Dept: PRIMARY CARE | Facility: CLINIC | Age: 64
End: 2024-08-05
Payer: MEDICARE

## 2024-08-05 ASSESSMENT — ENCOUNTER SYMPTOMS
SEIZURES: 0
SPEECH DIFFICULTY: 0
HEADACHES: 0
BLACKOUTS: 0
NERVOUS/ANXIOUS: 0
CONFUSION: 0
DIZZINESS: 0
TREMORS: 0
SWEATS: 0
HUNGER: 0

## 2024-08-20 ASSESSMENT — ENCOUNTER SYMPTOMS
DIZZINESS: 0
HEADACHES: 0
SWEATS: 0
SPEECH DIFFICULTY: 0
CONFUSION: 0
BLURRED VISION: 0
FATIGUE: 0
WEIGHT LOSS: 0
TREMORS: 0
NERVOUS/ANXIOUS: 0
POLYPHAGIA: 0
VISUAL CHANGE: 0
BLACKOUTS: 0
POLYDIPSIA: 0
SEIZURES: 0
WEAKNESS: 0
HUNGER: 0

## 2024-08-21 ENCOUNTER — APPOINTMENT (OUTPATIENT)
Dept: PRIMARY CARE | Facility: CLINIC | Age: 64
End: 2024-08-21
Payer: MEDICARE

## 2024-08-21 VITALS
HEART RATE: 76 BPM | TEMPERATURE: 96.3 F | SYSTOLIC BLOOD PRESSURE: 128 MMHG | WEIGHT: 164 LBS | DIASTOLIC BLOOD PRESSURE: 84 MMHG | OXYGEN SATURATION: 97 % | BODY MASS INDEX: 29.76 KG/M2

## 2024-08-21 DIAGNOSIS — I10 PRIMARY HYPERTENSION: ICD-10-CM

## 2024-08-21 DIAGNOSIS — E78.5 HYPERLIPIDEMIA, UNSPECIFIED: ICD-10-CM

## 2024-08-21 DIAGNOSIS — Z79.4 TYPE 2 DIABETES MELLITUS WITH OTHER SPECIFIED COMPLICATION, WITH LONG-TERM CURRENT USE OF INSULIN (MULTI): ICD-10-CM

## 2024-08-21 DIAGNOSIS — E11.69 TYPE 2 DIABETES MELLITUS WITH OTHER SPECIFIED COMPLICATION, WITH LONG-TERM CURRENT USE OF INSULIN (MULTI): ICD-10-CM

## 2024-08-21 LAB
POC ALBUMIN /CREATININE RATIO MANUALLY ENTERED: ABNORMAL UG/MG CREAT
POC HEMOGLOBIN A1C: 7.9 % (ref 4.2–6.5)
POC URINE ALBUMIN: 80 MG/L
POC URINE CREATININE: 200 MG/DL

## 2024-08-21 PROCEDURE — 1036F TOBACCO NON-USER: CPT | Performed by: FAMILY MEDICINE

## 2024-08-21 PROCEDURE — 82044 UR ALBUMIN SEMIQUANTITATIVE: CPT | Performed by: FAMILY MEDICINE

## 2024-08-21 PROCEDURE — 99214 OFFICE O/P EST MOD 30 MIN: CPT | Performed by: FAMILY MEDICINE

## 2024-08-21 PROCEDURE — 3079F DIAST BP 80-89 MM HG: CPT | Performed by: FAMILY MEDICINE

## 2024-08-21 PROCEDURE — 1159F MED LIST DOCD IN RCRD: CPT | Performed by: FAMILY MEDICINE

## 2024-08-21 PROCEDURE — 3074F SYST BP LT 130 MM HG: CPT | Performed by: FAMILY MEDICINE

## 2024-08-21 PROCEDURE — 83036 HEMOGLOBIN GLYCOSYLATED A1C: CPT | Performed by: FAMILY MEDICINE

## 2024-08-21 RX ORDER — CARVEDILOL 6.25 MG/1
6.25 TABLET ORAL
Qty: 180 TABLET | Refills: 0 | Status: SHIPPED | OUTPATIENT
Start: 2024-08-21

## 2024-08-21 RX ORDER — ROSUVASTATIN CALCIUM 5 MG/1
5 TABLET, COATED ORAL NIGHTLY
Qty: 90 TABLET | Refills: 0 | Status: SHIPPED | OUTPATIENT
Start: 2024-08-21 | End: 2025-08-21

## 2024-08-21 ASSESSMENT — ENCOUNTER SYMPTOMS
SEIZURES: 0
EYE ITCHING: 0
PALPITATIONS: 0
VOMITING: 0
DIARRHEA: 0
EYE DISCHARGE: 0
BLOOD IN STOOL: 0
NERVOUS/ANXIOUS: 0
ABDOMINAL PAIN: 0
HUNGER: 0
WEAKNESS: 0
CONSTIPATION: 0
UNEXPECTED WEIGHT CHANGE: 0
FLANK PAIN: 0
RHINORRHEA: 0
SPEECH DIFFICULTY: 0
COUGH: 0
BLACKOUTS: 0
MYALGIAS: 0
HEADACHES: 0
CONFUSION: 0
NUMBNESS: 0
FEVER: 0
LIGHT-HEADEDNESS: 0
ARTHRALGIAS: 0
SHORTNESS OF BREATH: 0
APPETITE CHANGE: 0
NAUSEA: 0
DIZZINESS: 0
HEMATURIA: 0
JOINT SWELLING: 0
DYSURIA: 0
SINUS PRESSURE: 0
SORE THROAT: 0
SLEEP DISTURBANCE: 0
TREMORS: 0
WHEEZING: 0
DYSPHORIC MOOD: 0
ACTIVITY CHANGE: 0
SWEATS: 0

## 2024-08-21 NOTE — PROGRESS NOTES
Subjective   Patient ID: Leonie Wilkinson is a 63 y.o. female who presents for Diabetes (Last A1C was completed on 4/19/24 resulting in 7.4.  Today in office./Micro in office today as well 7.9.).    Diabetes  Pertinent negatives for hypoglycemia include no confusion, dizziness, headaches, hunger, mood changes, nervousness/anxiousness, pallor, seizures, sleepiness, speech difficulty, sweats or tremors. Pertinent negatives for diabetes include no chest pain, no polyuria and no weakness. Hypoglycemia complications include nocturnal hypoglycemia and required assistance. Pertinent negatives for hypoglycemia complications include no blackouts, no hospitalization and no required glucagon injection. Diabetic complications include retinopathy. Pertinent negatives for diabetic complications include no CVA, heart disease, impotence, nephropathy, peripheral neuropathy or PVD. Risk factors for coronary artery disease include dyslipidemia, family history, hypertension and obesity. Current diabetic treatment includes insulin injections and oral agent (dual therapy). She is compliant with treatment most of the time. She is currently taking insulin pre-breakfast. Rotation sites for injection include the abdominal wall. Her weight is increasing steadily. She is following a generally healthy diet. Meal planning includes carbohydrate counting. She has not had a previous visit with a dietitian. She participates in exercise three times a week. Her breakfast blood glucose is taken between 8-9 am. Her breakfast blood glucose range is generally 140-180 mg/dl. Her lunch blood glucose is taken between 1-2 pm. Her lunch blood glucose range is generally 140-180 mg/dl. Her dinner blood glucose is taken between 6-7 pm. Her dinner blood glucose range is generally 140-180 mg/dl. Her bedtime blood glucose is taken between 10-11 pm. Her bedtime blood glucose range is generally >200 mg/dl. Her overall blood glucose range is 180-200 mg/dl. She does not  see a podiatrist.Eye exam is current.    LONG TERM GLYCEMIC CONTROL IS ABOUT THE SAME  URINE MICRO ALBUMIN   HER GFR AT WELLNESS 6 MONTHS AGO IS >90   PROTECT THE KIDNEYS IS COUNSELED   RECENT CATARACT SURGERY RESTRICTED HER ACTIVITY         Review of Systems   Constitutional:  Negative for activity change, appetite change, fever and unexpected weight change.   HENT:  Negative for congestion, ear pain, postnasal drip, rhinorrhea, sinus pressure and sore throat.    Eyes:  Negative for discharge, itching and visual disturbance.   Respiratory:  Negative for cough, shortness of breath and wheezing.    Cardiovascular:  Negative for chest pain, palpitations and leg swelling.   Gastrointestinal:  Negative for abdominal pain, blood in stool, constipation, diarrhea, nausea and vomiting.   Endocrine: Negative for cold intolerance, heat intolerance and polyuria.   Genitourinary:  Negative for dysuria, flank pain, hematuria and impotence.   Musculoskeletal:  Negative for arthralgias, gait problem, joint swelling and myalgias.   Skin:  Negative for pallor and rash.   Allergic/Immunologic: Negative for environmental allergies and food allergies.   Neurological:  Negative for dizziness, tremors, seizures, syncope, speech difficulty, weakness, light-headedness, numbness and headaches.   Psychiatric/Behavioral:  Negative for confusion, dysphoric mood and sleep disturbance. The patient is not nervous/anxious.        Objective   /84   Pulse 76   Temp 35.7 °C (96.3 °F)   Wt 74.4 kg (164 lb)   SpO2 97%   BMI 29.76 kg/m²     Physical Exam  Vitals and nursing note reviewed.   Constitutional:       Appearance: Normal appearance.   HENT:      Head: Normocephalic.   Cardiovascular:      Rate and Rhythm: Normal rate and regular rhythm.      Pulses: Normal pulses.      Heart sounds: Normal heart sounds. No murmur heard.     No friction rub. No gallop.   Pulmonary:      Effort: Pulmonary effort is normal. No respiratory  distress.      Breath sounds: Normal breath sounds. No wheezing.   Abdominal:      General: There is no distension.      Palpations: Abdomen is soft.      Tenderness: There is no abdominal tenderness.   Musculoskeletal:         General: No deformity. Normal range of motion.   Skin:     General: Skin is warm and dry.      Capillary Refill: Capillary refill takes less than 2 seconds.   Neurological:      General: No focal deficit present.      Mental Status: She is alert and oriented to person, place, and time.   Psychiatric:         Mood and Affect: Mood normal.         Assessment/Plan   Problem List Items Addressed This Visit             ICD-10-CM    DMII (diabetes mellitus, type 2) (Multi) E11.9    Relevant Orders    POCT glycosylated hemoglobin (Hb A1C) manually resulted (Completed)    POCT Albumin Random Urine manually resulted (Completed)

## 2024-08-21 NOTE — PATIENT INSTRUCTIONS
RESUME THERE ACTIVITY LEVEL   FOLLOW IN 3 MORE OR SO AND THAT IT TIME FOR ALL THE LABS   SEES ENDO VIRTUALLY

## 2024-09-03 ENCOUNTER — APPOINTMENT (OUTPATIENT)
Dept: ENDOCRINOLOGY | Facility: CLINIC | Age: 64
End: 2024-09-03
Payer: MEDICARE

## 2024-09-11 DIAGNOSIS — Z79.4 TYPE 2 DIABETES MELLITUS WITHOUT COMPLICATION, WITH LONG-TERM CURRENT USE OF INSULIN (MULTI): ICD-10-CM

## 2024-09-11 DIAGNOSIS — E11.9 TYPE 2 DIABETES MELLITUS WITHOUT COMPLICATION, WITH LONG-TERM CURRENT USE OF INSULIN (MULTI): ICD-10-CM

## 2024-09-11 RX ORDER — PEN NEEDLE, DIABETIC 30 GX3/16"
NEEDLE, DISPOSABLE MISCELLANEOUS
Qty: 600 EACH | Refills: 2 | Status: SHIPPED | OUTPATIENT
Start: 2024-09-11

## 2024-09-17 PROBLEM — E11.65 UNCONTROLLED TYPE 2 DIABETES MELLITUS WITH HYPERGLYCEMIA, WITH LONG-TERM CURRENT USE OF INSULIN: Status: ACTIVE | Noted: 2023-04-12

## 2024-09-17 PROBLEM — Z79.4 UNCONTROLLED TYPE 2 DIABETES MELLITUS WITH HYPERGLYCEMIA, WITH LONG-TERM CURRENT USE OF INSULIN: Status: ACTIVE | Noted: 2023-04-12

## 2024-09-23 DIAGNOSIS — E11.9 TYPE 2 DIABETES MELLITUS WITHOUT COMPLICATION, UNSPECIFIED WHETHER LONG TERM INSULIN USE (MULTI): ICD-10-CM

## 2024-09-24 RX ORDER — PEN NEEDLE, DIABETIC 30 GX3/16"
NEEDLE, DISPOSABLE MISCELLANEOUS
Qty: 200 EACH | Refills: 0 | Status: SHIPPED | OUTPATIENT
Start: 2024-09-24

## 2024-10-02 DIAGNOSIS — E11.649 UNCONTROLLED TYPE 2 DIABETES MELLITUS WITH HYPOGLYCEMIA WITHOUT COMA: ICD-10-CM

## 2024-10-03 RX ORDER — FLASH GLUCOSE SENSOR
KIT MISCELLANEOUS
Qty: 6 EACH | Refills: 1 | Status: SHIPPED | OUTPATIENT
Start: 2024-10-03

## 2024-10-07 ASSESSMENT — DERMATOLOGY QUALITY OF LIFE (QOL) ASSESSMENT
WHAT SINGLE SKIN CONDITION LISTED BELOW IS THE PATIENT ANSWERING THE QUALITY-OF-LIFE ASSESSMENT QUESTIONS ABOUT: NONE OF THE ABOVE
WHAT SINGLE SKIN CONDITION LISTED BELOW IS THE PATIENT ANSWERING THE QUALITY-OF-LIFE ASSESSMENT QUESTIONS ABOUT: NONE OF THE ABOVE

## 2024-10-09 ENCOUNTER — APPOINTMENT (OUTPATIENT)
Dept: DERMATOLOGY | Facility: CLINIC | Age: 64
End: 2024-10-09
Payer: MEDICARE

## 2024-10-09 DIAGNOSIS — D18.01 ANGIOMA OF SKIN: ICD-10-CM

## 2024-10-09 DIAGNOSIS — L57.9 SKIN CHANGES DUE TO CHRONIC EXPOSURE TO NONIONIZING RADIATION: ICD-10-CM

## 2024-10-09 DIAGNOSIS — L90.5 SCAR CONDITIONS AND FIBROSIS OF SKIN: ICD-10-CM

## 2024-10-09 DIAGNOSIS — Z85.828 HISTORY OF NONMELANOMA SKIN CANCER: ICD-10-CM

## 2024-10-09 DIAGNOSIS — L82.1 SEBORRHEIC KERATOSIS: ICD-10-CM

## 2024-10-09 DIAGNOSIS — D48.5 NEOPLASM OF UNCERTAIN BEHAVIOR OF SKIN: ICD-10-CM

## 2024-10-09 DIAGNOSIS — D22.9 BENIGN NEVUS: ICD-10-CM

## 2024-10-09 DIAGNOSIS — L81.4 LENTIGO: ICD-10-CM

## 2024-10-09 PROCEDURE — 1160F RVW MEDS BY RX/DR IN RCRD: CPT | Performed by: NURSE PRACTITIONER

## 2024-10-09 PROCEDURE — 1036F TOBACCO NON-USER: CPT | Performed by: NURSE PRACTITIONER

## 2024-10-09 PROCEDURE — 1159F MED LIST DOCD IN RCRD: CPT | Performed by: NURSE PRACTITIONER

## 2024-10-09 PROCEDURE — 99213 OFFICE O/P EST LOW 20 MIN: CPT | Performed by: NURSE PRACTITIONER

## 2024-10-09 NOTE — PROGRESS NOTES
Subjective     Leonie Wilkinson is a 63 y.o. female who presents for the following: Skin Check.     Review of Systems:  No other skin or systemic complaints other than what is documented elsewhere in the note.    The following portions of the chart were reviewed this encounter and updated as appropriate:   Tobacco  Allergies  Meds  Problems  Med Hx  Surg Hx         Skin Cancer History  No skin cancer on file.      Specialty Problems          Dermatology Problems    History of basal cell carcinoma        Objective   Well appearing patient in no apparent distress; mood and affect are within normal limits.    A full examination was performed including scalp, head, eyes, ears, nose, lips, neck, chest, axillae, abdomen, back, buttocks, bilateral upper extremities, bilateral lower extremities, hands, feet, fingers, toes, fingernails, and toenails. All findings within normal limits unless otherwise noted below.      Assessment/Plan   1. Neoplasm of uncertain behavior of skin    Related Procedures  Follow Up In Dermatology - Established Patient    2. Angioma of skin  Scattered cherry-red papule(s).    A cherry hemangioma is a small macule (small, flat, smooth area) or papule (small, solid bump) formed from an overgrowth of tiny blood vessels in the skin. Cherry hemangiomas are characteristically red or purplish in color. They often first appear in middle adulthood and usually increase in number with age. Cherry hemangiomas are noncancerous (benign) and are common in adults.    The present appearance of the lesion is not worrisome but it should continue to be observed and testing/treatment may be warranted if change occurs.    Related Procedures  Follow Up In Dermatology - Established Patient    3. Benign nevus  Scattered, uniform and benign-appearing, regular brown melanocytic papules and macules.    1. Mid upper abdomen has a 6.5 x 6.5 mm dark brown macule reticular pattern some what varigated in color  2. Left  abdomen medial nevus is 7 x 9.5 mm tan brown broken reticular homogenous pattern  3. Left abdomen lateral nevus is 5.5 x 10 mmsimilar sweeney brown broken reticular homogenous pattern  4. Left lateral lower back has a 3.5 x 8 mm similar tan brown broken reticular homogenoous pattern   5. Right lateral lower back has a 3.5 x 10 mmsimilar sweeney brown broken reticular homogenouspattern.  6. Right glabella area has a 5.5 x 4 mm pink atrophic scar     The present appearance of the lesions are not worrisome but it should continue to be observed and testing/treatment may be warranted if change occurs.    Lesions being monitored are stable.     Related Procedures  Follow Up In Dermatology - Established Patient    4. Seborrheic keratosis  Stuck on verrucous, tan-brown papules and plaques.      Seborrheic keratoses are common noncancerous (benign) growths of unknown cause seen in adults due to a thickening of an area of the top skin layer. Seborrheic keratoses may appear as if they are stuck on to the skin. They have distinct borders, and they may appear as papules (small, solid bumps) or plaques (solid, raised patches that are bigger than a thumbnail). They may be the same color as your skin, or they may be pink, light brown, darker brown, or very dark brown, or sometimes may appear black.    There is no way to prevent new seborrheic keratoses from forming. Seborrheic keratoses can be removed, but removal is considered a cosmetic issue and is usually not covered by insurance.    PLAN  No treatment is needed unless there is irritation from clothing, such as itching or bleeding.  2.   Some lotions containing alpha hydroxy acids, salicylic acid, or urea may make the areas feel smoother with regular use but will not eliminate them.    Related Procedures  Follow Up In Dermatology - Established Patient    5. Lentigo  Scattered tan macules in sun-exposed areas.    A solar lentigo (plural, solar lentigines), also known as a sun-induced  freckle or senile lentigo, is a dark (hyperpigmented) lesion caused by natural or artificial ultraviolet (UV) light. Solar lentigines may be single or multiple. This type of lentigo is different from a simple lentigo (lentigo simplex) because it is caused by exposure to UV light. Solar lentigines are benign, but they do indicate excessive sun exposure, a risk factor for the development of skin cancer.    To prevent solar lentigines, avoid exposure to sunlight in midday (10 AM to 3 PM), wear sun-protective clothing (tightly woven clothes and hats), and apply sunscreen (SPF 30 UVA and UVB block).    The present appearance of the lesion is not worrisome but it should continue to be observed and testing/treatment may be warranted if change occurs.    Related Procedures  Follow Up In Dermatology - Established Patient    6. Scar conditions and fibrosis of skin  Mid Forehead  Well healed scar at the site(s) of prior treatment with no evidence of recurrence.          The scar is clear, there is no evidence of recurrence.  The present appearance of the scar is not worrisome but it should continue to be observed and testing/treatment may be warranted if change occurs.      Related Procedures  Follow Up In Dermatology - Established Patient    7. History of nonmelanoma skin cancer    ABCDEs of melanoma and atypical moles were discussed with the patient.    Patient was instructed to perform monthly self skin examination.  We recommended that the patient have regular full skin exams given an increased risk of subsequent skin cancers.    The patient was instructed to use sun protective behaviors including use of broad spectrum sunscreens and sun protective clothing to reduce risk of skin cancers.    Warning signs of non-melanoma skin cancer discussed.    Related Procedures  Follow Up In Dermatology - Established Patient    8. Skin changes due to chronic exposure to nonionizing radiation  Actinic changes in the form of freckles,  lentigines and hyper/hypopigmentation     ABCDEs of melanoma and atypical moles were discussed with the patient.    Patient was instructed to perform monthly self skin examination.  We recommended that the patient have regular full skin exams given an increased risk of subsequent skin cancers.    The patient was instructed to use sun protective behaviors including use of broad spectrum sunscreens and sun protective clothing to reduce risk of skin cancers.    Warning signs of non-melanoma skin cancer discussed.    Related Procedures  Follow Up In Dermatology - Established Patient        Return to clinic in 1 year for skin check/follow up or sooner if needed

## 2024-10-09 NOTE — PATIENT INSTRUCTIONS

## 2024-10-24 ENCOUNTER — APPOINTMENT (OUTPATIENT)
Dept: ENDOCRINOLOGY | Facility: CLINIC | Age: 64
End: 2024-10-24
Payer: MEDICARE

## 2024-10-24 VITALS — BODY MASS INDEX: 29.44 KG/M2 | HEIGHT: 62 IN | WEIGHT: 160 LBS

## 2024-10-24 DIAGNOSIS — E10.9 TYPE 1 DIABETES MELLITUS WITHOUT COMPLICATION: ICD-10-CM

## 2024-10-24 DIAGNOSIS — E11.21 DIABETIC NEPHROPATHY ASSOCIATED WITH TYPE 2 DIABETES MELLITUS (MULTI): ICD-10-CM

## 2024-10-24 DIAGNOSIS — E78.2 MIXED HYPERLIPIDEMIA: ICD-10-CM

## 2024-10-24 DIAGNOSIS — Z79.4 UNCONTROLLED TYPE 2 DIABETES MELLITUS WITH HYPERGLYCEMIA, WITH LONG-TERM CURRENT USE OF INSULIN: Primary | ICD-10-CM

## 2024-10-24 DIAGNOSIS — E11.65 UNCONTROLLED TYPE 2 DIABETES MELLITUS WITH HYPERGLYCEMIA, WITH LONG-TERM CURRENT USE OF INSULIN: Primary | ICD-10-CM

## 2024-10-24 PROCEDURE — 1036F TOBACCO NON-USER: CPT | Performed by: INTERNAL MEDICINE

## 2024-10-24 PROCEDURE — 95251 CONT GLUC MNTR ANALYSIS I&R: CPT | Performed by: INTERNAL MEDICINE

## 2024-10-24 PROCEDURE — G2211 COMPLEX E/M VISIT ADD ON: HCPCS | Performed by: INTERNAL MEDICINE

## 2024-10-24 PROCEDURE — 1159F MED LIST DOCD IN RCRD: CPT | Performed by: INTERNAL MEDICINE

## 2024-10-24 PROCEDURE — 3008F BODY MASS INDEX DOCD: CPT | Performed by: INTERNAL MEDICINE

## 2024-10-24 PROCEDURE — 1160F RVW MEDS BY RX/DR IN RCRD: CPT | Performed by: INTERNAL MEDICINE

## 2024-10-24 PROCEDURE — 99214 OFFICE O/P EST MOD 30 MIN: CPT | Performed by: INTERNAL MEDICINE

## 2024-10-24 RX ORDER — BLOOD-GLUCOSE,RECEIVER,CONT
EACH MISCELLANEOUS
Qty: 1 EACH | Refills: 0 | Status: SHIPPED | OUTPATIENT
Start: 2024-10-24

## 2024-10-24 RX ORDER — BLOOD-GLUCOSE SENSOR
1 EACH MISCELLANEOUS CONTINUOUS
Qty: 2 EACH | Refills: 3 | Status: SHIPPED | OUTPATIENT
Start: 2024-10-24 | End: 2024-11-23

## 2024-10-24 RX ORDER — TIRZEPATIDE 2.5 MG/.5ML
2.5 INJECTION, SOLUTION SUBCUTANEOUS WEEKLY
Qty: 2 ML | Refills: 11 | Status: SHIPPED | OUTPATIENT
Start: 2024-10-24

## 2024-10-24 ASSESSMENT — PATIENT HEALTH QUESTIONNAIRE - PHQ9
1. LITTLE INTEREST OR PLEASURE IN DOING THINGS: NOT AT ALL
2. FEELING DOWN, DEPRESSED OR HOPELESS: NOT AT ALL
SUM OF ALL RESPONSES TO PHQ9 QUESTIONS 1 AND 2: 0

## 2024-10-24 NOTE — PROGRESS NOTES
Consults    Reason For Consult  Diabetes     History Of Present Illness  Leonie Wilkinson is a 64 y.o. female presenting with type 2 diabetes mellitus with cataract and with nephropathy alb/creat 300 ?     Due to cataract surgery she was not exercising 8 weeks due to fear of pressure .   and BG increased   She ha shyperglycemia   And elevated hba1c            type 2 diabetes. date of diagnosis: 2012.      Diabetes Surveillance: Eye exam: 10/2024  Diabetes Complications: Diabetic cataract. , microalbuminuria    hyperlipidemia , HTN     Home Management    Results from Most Recent A1C  POC HEMOGLOBIN A1c   Date/Time Value Ref Range Status   08/21/2024 09:53 AM 7.9 (A) 4.2 - 6.5 % Final        Diabetes Problem List Entries with Dates  Problem List:  2024-10: Diabetic nephropathy associated with type 2 diabetes   mellitus (Multi)  2023-04: Uncontrolled type 2 diabetes mellitus with hyperglycemia,   with long-term current use of insulin        Any family history of thyroid cancer? no  Any personal history of radiation to your head/neck? no    Past Medical History  She has a past medical history of Actinic keratoses (04/12/2023), Arthritis (01/2013), Cataract (01/2018), Heart disease (01/01/2021), Hyperlipidemia, unspecified (03/09/2022), Hypertension (01/01:2013), Seizures (Multi) (06/01/1991), Type 2 diabetes mellitus without complications (Multi) (08/08/2022), and Varicella (01/01/1973 and 01/01/1979).    Surgical History  She has a past surgical history that includes Tonsillectomy (06/20/2014); Mouth surgery (06/20/2014); Other surgical history (02/25/2019); and Cataract extraction, bilateral (05/2024).     Social History  She reports that she has never smoked. She has never used smokeless tobacco. She reports that she does not currently use alcohol. She reports current drug use. Drug: Marijuana.    Family History  Family History   Problem Relation Name Age of Onset    Stroke Mother Olena Wilkinson     Heart disease  Mother Olena Wilkinson     Hypertension Mother Olena Wilkinson     Other (cardiac disorder) Father Pancho Wilkinson     Heart disease Father Pancho Wilkinson     Vision loss Paternal Grandmother Ana Terrazas Saunders [place]     Arthritis Mother's Sister Ijeoma Negrete         Allergies  Bydureon [exenatide microspheres], Dapagliflozin, Dapagliflozin propanediol, Sitagliptin phos-metformin, Amoxicillin, Carbamazepine, Phenytoin, and Sulfamethoxazole-trimethoprim    Review of Systems    Past Medical History:   Diagnosis Date    Actinic keratoses 04/12/2023    Arthritis 01/2013    Cataract 01/2018    Heart disease 01/01/2021    Hyperlipidemia, unspecified 03/09/2022    Hyperlipidemia    Hypertension 01/01:2013    Seizures (Multi) 06/01/1991    Type 2 diabetes mellitus without complications (Multi) 08/08/2022    DMII (diabetes mellitus, type 2)    Varicella 01/01/1973 and 01/01/1979       Past Surgical History:   Procedure Laterality Date    CATARACT EXTRACTION, BILATERAL  05/2024    MOUTH SURGERY  06/20/2014    Oral Surgery Tooth Extraction    OTHER SURGICAL HISTORY  02/25/2019    Biceps tenodesis procedure    TONSILLECTOMY  06/20/2014    Tonsillectomy       Social History     Socioeconomic History    Marital status: Single     Spouse name: Not on file    Number of children: Not on file    Years of education: Not on file    Highest education level: Not on file   Occupational History    Not on file   Tobacco Use    Smoking status: Never    Smokeless tobacco: Never    Tobacco comments:     Lived with a 2 pack per day smoker for 30 years   Vaping Use    Vaping status: Never Used   Substance and Sexual Activity    Alcohol use: Not Currently    Drug use: Yes     Types: Marijuana    Sexual activity: Not Currently   Other Topics Concern    Not on file   Social History Narrative    Not on file     Social Drivers of Health     Financial Resource Strain: Not on file   Food Insecurity: Not on file   Transportation Needs: Not on file  "  Physical Activity: Not on file   Stress: Not on file   Social Connections: Not on file   Intimate Partner Violence: Not on file   Housing Stability: Not on file        Physical Exam     ROS, PMH, FH/SH, surgical history and allergies have been reviewed.    Last Recorded Vitals  Height 1.575 m (5' 2\"), weight 72.6 kg (160 lb).    Relevant Results         If you would like to pull in Medications, type .meds     If you would like to pull in Lab results for the last 24 hours, type .[id number]    If you would like to pull in specific Lab results, type .ll     If you would like to pull in Imaging results, type .[id number] :99}  Lab Review  Lab Results   Component Value Date    BILITOT 0.6 11/09/2023    CALCIUM 9.6 11/09/2023    CO2 26 11/09/2023     11/09/2023    CREATININE 0.71 11/09/2023    GLUCOSE 128 (H) 11/09/2023    ALKPHOS 55 11/09/2023    K 4.7 11/09/2023    PROT 8.0 11/09/2023     11/09/2023    AST 15 11/09/2023    ALT 13 11/09/2023    BUN 8 11/09/2023    ANIONGAP 14 11/09/2023    MG 2.31 01/11/2021    PHOS 2.3 (L) 01/12/2021    ALBUMIN 4.6 11/09/2023    GFRF 90 04/12/2023     Lab Results   Component Value Date    TRIG 178 (H) 11/09/2023    CHOL 175 11/09/2023    LDLCALC 96 11/09/2023    HDL 43.6 11/09/2023     Lab Results   Component Value Date    HGBA1C 7.9 (A) 08/21/2024    HGBA1C 7.4 (A) 04/19/2024    HGBA1C 7.3 (H) 11/09/2023     The 10-year ASCVD risk score (David MIRANDA, et al., 2019) is: 11.9%    Values used to calculate the score:      Age: 64 years      Sex: Female      Is Non- : No      Diabetic: Yes      Tobacco smoker: No      Systolic Blood Pressure: 128 mmHg      Is BP treated: No      HDL Cholesterol: 43.6 mg/dL      Total Cholesterol: 175 mg/dL       Assessment/Plan   Problem List Items Addressed This Visit             ICD-10-CM    Hyperlipidemia E78.5    Relevant Medications    FreeStyle Agustin 3 [place] misc    Other Relevant Orders    Albumin-Creatinine Ratio, " Urine Random    Hemoglobin A1C    Lipid Panel    Follow Up In Endocrinology    Uncontrolled type 2 diabetes mellitus with hyperglycemia, with long-term current use of insulin - Primary E11.65, Z79.4    Relevant Medications    tirzepatide (Mounjaro) 2.5 mg/0.5 mL pen injector    FreeStyle Agustin 3 [place] misc    Other Relevant Orders    Referral to Clinical Pharmacy    Albumin-Creatinine Ratio, Urine Random    Hemoglobin A1C    Lipid Panel    Follow Up In Endocrinology    Diabetic nephropathy associated with type 2 diabetes mellitus (Multi) E11.21    Relevant Medications    tirzepatide (Mounjaro) 2.5 mg/0.5 mL pen injector    FreeStyle Agustin 3 [place] misc    Other Relevant Orders    Referral to Clinical Pharmacy    Albumin-Creatinine Ratio, Urine Random    Hemoglobin A1C    Lipid Panel    Follow Up In Endocrinology     Other Visit Diagnoses         Codes    Type 1 diabetes mellitus without complication     E10.9    Relevant Medications    blood-glucose sensor (FreeStyle Agustin 3 Sensor) device    FreeStyle Agustin 3 [place] misc                Lantus using 24 units before bed  70/30 18-20 three times a day   Tried : SGLT-2 DKA CONTRAINDICATED  Ozempic/trulicity you had reacted with swollen neck with bydureon       Ophthalmology visit (cataract removed/glaucoma ) 8/2024  Foot exam / podiatry : 8/21/2024       For hyperglycemia we will try mounjaro due to intolerance or serious side effect of alternatives  Due for lipids, repeat alb/creat ratio  Medicare requires every 3 months visit, she will follow up with her pcp      I spent a total of 30+ minutes on the date of the service which included preparing to see the patient, face-to-face patient care, completing clinical documentation, obtaining and / or reviewing separately obtained history, counseling and educating the patient/family/caregiver, ordering medications, tests, or procedures, communicating with other healthcare providers (not separately reported), independently  interpreting results, not separately reported, and communicating results to the patient/family/caregiver, real-time telemedicine visit using audiovisual technology with patient's verbal consent.  Name and date of birth verified.          Lynn Richardson MD

## 2024-11-08 DIAGNOSIS — E11.649 UNCONTROLLED TYPE 2 DIABETES MELLITUS WITH HYPOGLYCEMIA WITHOUT COMA: ICD-10-CM

## 2024-11-08 RX ORDER — INSULIN GLARGINE 100 [IU]/ML
INJECTION, SOLUTION SUBCUTANEOUS
Qty: 15 ML | Refills: 5 | Status: SHIPPED | OUTPATIENT
Start: 2024-11-08

## 2024-11-25 ASSESSMENT — ENCOUNTER SYMPTOMS
NERVOUS/ANXIOUS: 0
POLYDIPSIA: 0
HEADACHES: 0
SWEATS: 0
FATIGUE: 0
SEIZURES: 0
TREMORS: 0
VISUAL CHANGE: 0
BLURRED VISION: 0
WEIGHT LOSS: 0
POLYPHAGIA: 0
BLACKOUTS: 0
HUNGER: 0
DIZZINESS: 0
WEAKNESS: 0
CONFUSION: 0
SPEECH DIFFICULTY: 0

## 2024-11-27 ENCOUNTER — APPOINTMENT (OUTPATIENT)
Dept: PRIMARY CARE | Facility: CLINIC | Age: 64
End: 2024-11-27
Payer: MEDICARE

## 2024-11-27 VITALS
WEIGHT: 165 LBS | BODY MASS INDEX: 30.18 KG/M2 | HEART RATE: 60 BPM | TEMPERATURE: 96.8 F | OXYGEN SATURATION: 97 % | DIASTOLIC BLOOD PRESSURE: 92 MMHG | SYSTOLIC BLOOD PRESSURE: 154 MMHG

## 2024-11-27 DIAGNOSIS — I10 PRIMARY HYPERTENSION: ICD-10-CM

## 2024-11-27 DIAGNOSIS — E78.5 HYPERLIPIDEMIA, UNSPECIFIED: ICD-10-CM

## 2024-11-27 DIAGNOSIS — E11.9 TYPE 2 DIABETES MELLITUS WITHOUT COMPLICATION, UNSPECIFIED WHETHER LONG TERM INSULIN USE (MULTI): ICD-10-CM

## 2024-11-27 DIAGNOSIS — E11.649 UNCONTROLLED TYPE 2 DIABETES MELLITUS WITH HYPOGLYCEMIA WITHOUT COMA: Primary | ICD-10-CM

## 2024-11-27 LAB — POC HEMOGLOBIN A1C: 7.6 % (ref 4.2–6.5)

## 2024-11-27 PROCEDURE — 83036 HEMOGLOBIN GLYCOSYLATED A1C: CPT | Performed by: FAMILY MEDICINE

## 2024-11-27 PROCEDURE — 3077F SYST BP >= 140 MM HG: CPT | Performed by: FAMILY MEDICINE

## 2024-11-27 PROCEDURE — G2211 COMPLEX E/M VISIT ADD ON: HCPCS | Performed by: FAMILY MEDICINE

## 2024-11-27 PROCEDURE — 99214 OFFICE O/P EST MOD 30 MIN: CPT | Performed by: FAMILY MEDICINE

## 2024-11-27 PROCEDURE — 1159F MED LIST DOCD IN RCRD: CPT | Performed by: FAMILY MEDICINE

## 2024-11-27 PROCEDURE — 3080F DIAST BP >= 90 MM HG: CPT | Performed by: FAMILY MEDICINE

## 2024-11-27 RX ORDER — ROSUVASTATIN CALCIUM 5 MG/1
5 TABLET, COATED ORAL NIGHTLY
Qty: 90 TABLET | Refills: 1 | Status: SHIPPED | OUTPATIENT
Start: 2024-11-27 | End: 2025-11-27

## 2024-11-27 RX ORDER — CARVEDILOL 6.25 MG/1
6.25 TABLET ORAL
Qty: 180 TABLET | Refills: 1 | Status: CANCELLED | OUTPATIENT
Start: 2024-11-27

## 2024-11-27 RX ORDER — PEN NEEDLE, DIABETIC 30 GX3/16"
NEEDLE, DISPOSABLE MISCELLANEOUS
Qty: 200 EACH | Refills: 2 | Status: CANCELLED | OUTPATIENT
Start: 2024-11-27

## 2024-11-27 RX ORDER — BLOOD-GLUCOSE SENSOR
1 EACH MISCELLANEOUS
Qty: 2 EACH | Refills: 3 | Status: SHIPPED | OUTPATIENT
Start: 2024-11-27

## 2024-11-27 RX ORDER — PEN NEEDLE, DIABETIC 30 GX3/16"
NEEDLE, DISPOSABLE MISCELLANEOUS
Qty: 200 EACH | Refills: 3 | Status: SHIPPED | OUTPATIENT
Start: 2024-11-27

## 2024-11-27 RX ORDER — INSULIN HUMAN 100 [IU]/ML
INJECTION, SUSPENSION SUBCUTANEOUS
Qty: 3 ML | Refills: 3 | Status: CANCELLED | OUTPATIENT
Start: 2024-11-27

## 2024-11-27 RX ORDER — CARVEDILOL 6.25 MG/1
6.25 TABLET ORAL
Qty: 180 TABLET | Refills: 1 | Status: SHIPPED | OUTPATIENT
Start: 2024-11-27

## 2024-11-27 RX ORDER — INSULIN GLARGINE 100 [IU]/ML
INJECTION, SOLUTION SUBCUTANEOUS
Qty: 15 ML | Refills: 5 | Status: CANCELLED | OUTPATIENT
Start: 2024-11-27

## 2024-11-27 RX ORDER — INSULIN HUMAN 100 [IU]/ML
INJECTION, SUSPENSION SUBCUTANEOUS
Qty: 3 ML | Refills: 11 | Status: SHIPPED | OUTPATIENT
Start: 2024-11-27

## 2024-11-27 RX ORDER — INSULIN GLARGINE 100 [IU]/ML
INJECTION, SOLUTION SUBCUTANEOUS
Qty: 15 ML | Refills: 5 | Status: SHIPPED | OUTPATIENT
Start: 2024-11-27

## 2024-11-27 RX ORDER — BLOOD-GLUCOSE SENSOR
1 EACH MISCELLANEOUS
Qty: 2 EACH | Refills: 2 | Status: CANCELLED | OUTPATIENT
Start: 2024-11-27

## 2024-11-27 RX ORDER — BLOOD-GLUCOSE SENSOR
EACH MISCELLANEOUS
COMMUNITY
End: 2024-11-27 | Stop reason: SDUPTHER

## 2024-11-27 RX ORDER — ROSUVASTATIN CALCIUM 5 MG/1
5 TABLET, COATED ORAL NIGHTLY
Qty: 90 TABLET | Refills: 1 | Status: CANCELLED | OUTPATIENT
Start: 2024-11-27 | End: 2025-11-27

## 2024-11-27 NOTE — PROGRESS NOTES
Subjective   Patient ID: Leonie Wilkinson is a 64 y.o. female who presents for Diabetes (Last A1C was completed on 8/21/24 resulting in 7.9.  Today in the office 7.6.).    Diabetes  She has type 2 diabetes mellitus. No MedicAlert identification noted. The initial diagnosis of diabetes was made 15 years ago. Pertinent negatives for hypoglycemia include no confusion, dizziness, headaches, hunger, mood changes, nervousness/anxiousness, pallor, seizures, sleepiness, speech difficulty, sweats or tremors. Pertinent negatives for diabetes include no blurred vision, no chest pain, no fatigue, no foot paresthesias, no foot ulcerations, no polydipsia, no polyphagia, no polyuria, no visual change, no weakness and no weight loss. Pertinent negatives for hypoglycemia complications include no blackouts, no hospitalization, no nocturnal hypoglycemia, no required assistance and no required glucagon injection. Symptoms are stable. Diabetic complications include retinopathy. Pertinent negatives for diabetic complications include no CVA, heart disease, impotence, nephropathy, peripheral neuropathy or PVD. Risk factors for coronary artery disease include dyslipidemia, family history, hypertension, obesity and post-menopausal. Current diabetic treatment includes diet and insulin injections. She is compliant with treatment most of the time. She is currently taking insulin pre-breakfast, pre-lunch, pre-dinner and at bedtime. Insulin injections are given by patient. Rotation sites for injection include the abdominal wall. Her weight is increasing steadily. She is following a generally healthy diet. Meal planning includes avoidance of concentrated sweets and carbohydrate counting. She has not had a previous visit with a dietitian. She participates in exercise three times a week. She monitors blood glucose at home 5+ x per day. She monitors urine at home <1 x per month. Blood glucose monitoring compliance is good. Her home blood glucose trend  is fluctuating minimally. Her breakfast blood glucose is taken between 8-9 am. Her breakfast blood glucose range is generally 140-180 mg/dl. Her lunch blood glucose is taken between 1-2 pm. Her lunch blood glucose range is generally 130-140 mg/dl. Her dinner blood glucose is taken between 5-6 pm. Her dinner blood glucose range is generally 130-140 mg/dl. Her bedtime blood glucose is taken between 9-10 pm. Her bedtime blood glucose range is generally 180-200 mg/dl. Her overall blood glucose range is 140-180 mg/dl. She does not see a podiatrist.Eye exam is current.    LONG TERM DIABETES CONTROL IS SOME BETTER   WEIGHT   SAME AND BMI IS 30  B/P UNCONTROLLED     Review of Systems   Constitutional:  Negative for activity change, appetite change, fatigue, fever, unexpected weight change and weight loss.   HENT:  Negative for congestion, ear pain, postnasal drip, rhinorrhea, sinus pressure and sore throat.    Eyes:  Negative for blurred vision, discharge, itching and visual disturbance.   Respiratory:  Negative for cough, shortness of breath and wheezing.    Cardiovascular:  Negative for chest pain, palpitations and leg swelling.   Gastrointestinal:  Negative for abdominal pain, blood in stool, constipation, diarrhea, nausea and vomiting.   Endocrine: Negative for cold intolerance, heat intolerance, polydipsia, polyphagia and polyuria.   Genitourinary:  Negative for dysuria, flank pain, hematuria and impotence.   Musculoskeletal:  Negative for arthralgias, gait problem, joint swelling and myalgias.   Skin:  Negative for pallor and rash.   Allergic/Immunologic: Negative for environmental allergies and food allergies.   Neurological:  Negative for dizziness, tremors, seizures, syncope, speech difficulty, weakness, light-headedness, numbness and headaches.   Psychiatric/Behavioral:  Negative for confusion, dysphoric mood and sleep disturbance. The patient is not nervous/anxious.        Objective   BP (!) 154/92   Pulse 60    "Temp 36 °C (96.8 °F)   Wt 74.8 kg (165 lb)   SpO2 97%   BMI 30.18 kg/m²     Physical Exam  Vitals and nursing note reviewed.   Constitutional:       Appearance: Normal appearance.   HENT:      Head: Normocephalic.   Cardiovascular:      Rate and Rhythm: Normal rate and regular rhythm.      Pulses: Normal pulses.      Heart sounds: Normal heart sounds. No murmur heard.     No friction rub. No gallop.   Pulmonary:      Effort: Pulmonary effort is normal. No respiratory distress.      Breath sounds: Normal breath sounds. No wheezing.   Abdominal:      General: There is no distension.      Palpations: Abdomen is soft.      Tenderness: There is no abdominal tenderness.   Musculoskeletal:         General: No deformity. Normal range of motion.   Skin:     General: Skin is warm and dry.      Capillary Refill: Capillary refill takes less than 2 seconds.   Neurological:      General: No focal deficit present.      Mental Status: She is alert and oriented to person, place, and time.   Psychiatric:         Mood and Affect: Mood normal.         Assessment/Plan   Problem List Items Addressed This Visit             ICD-10-CM    Hypertension I10    Relevant Medications    carvedilol (Coreg) 6.25 mg tablet    Uncontrolled type 2 diabetes mellitus with hypoglycemia without coma - Primary E11.649    Relevant Medications    insulin glargine (Lantus Solostar U-100 Insulin) 100 unit/mL (3 mL) pen    HumuLIN 70/30 U-100 KwikPen 100 unit/mL (70-30) injection    blood-glucose sensor (FreeStyle Agustin 2 Plus Sensor) device    Other Relevant Orders    POCT glycosylated hemoglobin (Hb A1C) manually resulted (Completed)    Type 2 diabetes mellitus without complication (Multi) E11.9    Relevant Medications    pen needle, diabetic (Unifine Pentips Plus) 32 gauge x 5/32\" needle    blood-glucose sensor (FreeStyle Agustin 2 Plus Sensor) device    Hyperlipidemia, unspecified E78.5    Relevant Medications    rosuvastatin (Crestor) 5 mg tablet        " Home

## 2024-12-03 ASSESSMENT — ENCOUNTER SYMPTOMS
POLYPHAGIA: 0
WHEEZING: 0
ACTIVITY CHANGE: 0
ARTHRALGIAS: 0
HEADACHES: 0
SORE THROAT: 0
EYE ITCHING: 0
POLYDIPSIA: 0
TREMORS: 0
NAUSEA: 0
FLANK PAIN: 0
APPETITE CHANGE: 0
BLOOD IN STOOL: 0
BLACKOUTS: 0
JOINT SWELLING: 0
SINUS PRESSURE: 0
UNEXPECTED WEIGHT CHANGE: 0
DYSPHORIC MOOD: 0
HEMATURIA: 0
SWEATS: 0
ABDOMINAL PAIN: 0
SPEECH DIFFICULTY: 0
DIZZINESS: 0
LIGHT-HEADEDNESS: 0
SLEEP DISTURBANCE: 0
VISUAL CHANGE: 0
RHINORRHEA: 0
FEVER: 0
MYALGIAS: 0
WEIGHT LOSS: 0
VOMITING: 0
DYSURIA: 0
CONSTIPATION: 0
NERVOUS/ANXIOUS: 0
FATIGUE: 0
SHORTNESS OF BREATH: 0
HUNGER: 0
EYE DISCHARGE: 0
PALPITATIONS: 0
NUMBNESS: 0
BLURRED VISION: 0
SEIZURES: 0
DIARRHEA: 0
COUGH: 0
CONFUSION: 0
WEAKNESS: 0

## 2024-12-09 ENCOUNTER — PATIENT MESSAGE (OUTPATIENT)
Dept: ENDOCRINOLOGY | Facility: CLINIC | Age: 64
End: 2024-12-09
Payer: MEDICARE

## 2025-03-05 ENCOUNTER — APPOINTMENT (OUTPATIENT)
Dept: PRIMARY CARE | Facility: CLINIC | Age: 65
End: 2025-03-05
Payer: MEDICARE

## 2025-03-09 ASSESSMENT — ENCOUNTER SYMPTOMS
TREMORS: 0
WEIGHT LOSS: 0
SEIZURES: 0
NERVOUS/ANXIOUS: 0
VISUAL CHANGE: 1
POLYDIPSIA: 0
DIZZINESS: 0
WEAKNESS: 0
BLURRED VISION: 0
BLACKOUTS: 0
FATIGUE: 0
CONFUSION: 0
POLYPHAGIA: 0
HEADACHES: 0
HUNGER: 0
SWEATS: 0
SPEECH DIFFICULTY: 0

## 2025-03-10 ENCOUNTER — APPOINTMENT (OUTPATIENT)
Dept: PRIMARY CARE | Facility: CLINIC | Age: 65
End: 2025-03-10
Payer: MEDICARE

## 2025-03-10 VITALS
TEMPERATURE: 97.3 F | OXYGEN SATURATION: 94 % | HEART RATE: 70 BPM | BODY MASS INDEX: 29.7 KG/M2 | DIASTOLIC BLOOD PRESSURE: 78 MMHG | WEIGHT: 162.4 LBS | SYSTOLIC BLOOD PRESSURE: 132 MMHG

## 2025-03-10 DIAGNOSIS — E11.9 TYPE 2 DIABETES MELLITUS WITHOUT COMPLICATION, UNSPECIFIED WHETHER LONG TERM INSULIN USE (MULTI): Primary | ICD-10-CM

## 2025-03-10 DIAGNOSIS — I10 PRIMARY HYPERTENSION: ICD-10-CM

## 2025-03-10 LAB — POC HEMOGLOBIN A1C: 7.5 % (ref 4.2–6.5)

## 2025-03-10 PROCEDURE — 3075F SYST BP GE 130 - 139MM HG: CPT | Performed by: FAMILY MEDICINE

## 2025-03-10 PROCEDURE — 83036 HEMOGLOBIN GLYCOSYLATED A1C: CPT | Performed by: FAMILY MEDICINE

## 2025-03-10 PROCEDURE — 1159F MED LIST DOCD IN RCRD: CPT | Performed by: FAMILY MEDICINE

## 2025-03-10 PROCEDURE — 99214 OFFICE O/P EST MOD 30 MIN: CPT | Performed by: FAMILY MEDICINE

## 2025-03-10 PROCEDURE — 1036F TOBACCO NON-USER: CPT | Performed by: FAMILY MEDICINE

## 2025-03-10 PROCEDURE — 3078F DIAST BP <80 MM HG: CPT | Performed by: FAMILY MEDICINE

## 2025-03-10 ASSESSMENT — ENCOUNTER SYMPTOMS
BLACKOUTS: 0
FATIGUE: 0
WEIGHT LOSS: 0
HEADACHES: 0
POLYDIPSIA: 0
DIZZINESS: 0
WEAKNESS: 0
NERVOUS/ANXIOUS: 0
SPEECH DIFFICULTY: 0
VISUAL CHANGE: 1
SEIZURES: 0
POLYPHAGIA: 0
HUNGER: 0
BLURRED VISION: 0
SWEATS: 0
TREMORS: 0
CONFUSION: 0

## 2025-03-10 NOTE — PROGRESS NOTES
Subjective   Patient ID: Leonie Wilkinson is a 64 y.o. female who presents for Diabetes (A1C CHECK/LAST DONE 11/27/24 WAS 7.6/TODAY IS 7.5).    Diabetes  She has type 2 diabetes mellitus. No MedicAlert identification noted. The initial diagnosis of diabetes was made 13 days ago. Pertinent negatives for hypoglycemia include no confusion, dizziness, headaches, hunger, mood changes, nervousness/anxiousness, pallor, seizures, sleepiness, speech difficulty, sweats or tremors. Associated symptoms include polyuria and visual change. Pertinent negatives for diabetes include no blurred vision, no chest pain, no fatigue, no foot paresthesias, no foot ulcerations, no polydipsia, no polyphagia, no weakness and no weight loss. Hypoglycemia complications include nocturnal hypoglycemia. Pertinent negatives for hypoglycemia complications include no blackouts, no hospitalization, no required assistance and no required glucagon injection. Symptoms are improving. Diabetic complications include retinopathy. Pertinent negatives for diabetic complications include no CVA, heart disease, impotence, nephropathy, peripheral neuropathy or PVD. Risk factors for coronary artery disease include dyslipidemia, family history, hypertension, obesity and post-menopausal. Current diabetic treatment includes diet, insulin injections and oral agent (dual therapy). She is compliant with treatment all of the time. She is currently taking insulin pre-breakfast, pre-lunch, pre-dinner and at bedtime. Insulin injections are given by patient. Rotation sites for injection include the abdominal wall. Her weight is stable. She is following a generally healthy diet. Meal planning includes avoidance of concentrated sweets and carbohydrate counting. She has not had a previous visit with a dietitian. She participates in exercise three times a week. She monitors blood glucose at home 5+ x per day. She monitors urine at home <1 x per month. Blood glucose monitoring  "compliance is good. Her home blood glucose trend is fluctuating minimally. Her breakfast blood glucose is taken between 8-9 am. Her breakfast blood glucose range is generally 130-140 mg/dl. Her lunch blood glucose is taken between 1-2 pm. Her lunch blood glucose range is generally 130-140 mg/dl. Her dinner blood glucose is taken between 5-6 pm. Her dinner blood glucose range is generally 130-140 mg/dl. Her bedtime blood glucose is taken between 9-10 pm. Her bedtime blood glucose range is generally 140-180 mg/dl. Her overall blood glucose range is 140-180 mg/dl. She does not see a podiatrist.Eye exam is current.    CONTINUES TO \"\"TRY\"    Review of Systems   Constitutional:  Negative for fatigue and weight loss.   Eyes:  Negative for blurred vision.   Cardiovascular:  Negative for chest pain.   Endocrine: Positive for polyuria. Negative for polydipsia and polyphagia.   Genitourinary:  Negative for impotence.   Skin:  Negative for pallor.   Neurological:  Negative for dizziness, tremors, seizures, speech difficulty, weakness and headaches.   Psychiatric/Behavioral:  Negative for confusion. The patient is not nervous/anxious.        Objective   /78   Pulse 70   Temp 36.3 °C (97.3 °F)   Wt 73.7 kg (162 lb 6.4 oz)   SpO2 94%   BMI 29.70 kg/m²     Physical Exam  Vitals and nursing note reviewed.   Constitutional:       Appearance: Normal appearance.   HENT:      Head: Normocephalic.   Cardiovascular:      Rate and Rhythm: Normal rate and regular rhythm.      Pulses: Normal pulses.      Heart sounds: Normal heart sounds. No murmur heard.     No friction rub. No gallop.   Pulmonary:      Effort: Pulmonary effort is normal. No respiratory distress.      Breath sounds: Normal breath sounds. No wheezing.   Abdominal:      General: There is no distension.      Tenderness: There is no abdominal tenderness.   Musculoskeletal:         General: No deformity. Normal range of motion.   Skin:     General: Skin is warm and " dry.      Capillary Refill: Capillary refill takes less than 2 seconds.   Neurological:      General: No focal deficit present.      Mental Status: She is alert and oriented to person, place, and time.   Psychiatric:         Mood and Affect: Mood normal.       Assessment/Plan   Problem List Items Addressed This Visit             ICD-10-CM    Hypertension - Primary I10    Type 2 diabetes mellitus without complication (Multi) E11.9    Relevant Orders    POCT glycosylated hemoglobin (Hb A1C) manually resulted (Completed)

## 2025-03-31 DIAGNOSIS — F41.9 ANXIETY AND DEPRESSION: Primary | ICD-10-CM

## 2025-03-31 DIAGNOSIS — F32.A ANXIETY AND DEPRESSION: Primary | ICD-10-CM

## 2025-03-31 RX ORDER — FLUOXETINE HYDROCHLORIDE 20 MG/1
20 CAPSULE ORAL DAILY
Qty: 30 CAPSULE | Refills: 1 | Status: SHIPPED | OUTPATIENT
Start: 2025-03-31 | End: 2025-03-31 | Stop reason: ENTERED-IN-ERROR

## 2025-04-09 DIAGNOSIS — E11.649 UNCONTROLLED TYPE 2 DIABETES MELLITUS WITH HYPOGLYCEMIA WITHOUT COMA: ICD-10-CM

## 2025-04-11 RX ORDER — INSULIN HUMAN 100 [IU]/ML
INJECTION, SUSPENSION SUBCUTANEOUS
Qty: 3 ML | Refills: 11 | Status: SHIPPED | OUTPATIENT
Start: 2025-04-11

## 2025-05-07 DIAGNOSIS — E78.5 HYPERLIPIDEMIA, UNSPECIFIED: ICD-10-CM

## 2025-05-08 RX ORDER — ROSUVASTATIN CALCIUM 5 MG/1
5 TABLET, COATED ORAL NIGHTLY
Qty: 90 TABLET | Refills: 1 | Status: SHIPPED | OUTPATIENT
Start: 2025-05-08 | End: 2026-05-08

## 2025-05-25 DIAGNOSIS — E11.9 TYPE 2 DIABETES MELLITUS WITHOUT COMPLICATION, UNSPECIFIED WHETHER LONG TERM INSULIN USE: ICD-10-CM

## 2025-05-25 DIAGNOSIS — E11.649 UNCONTROLLED TYPE 2 DIABETES MELLITUS WITH HYPOGLYCEMIA WITHOUT COMA: ICD-10-CM

## 2025-05-27 RX ORDER — BLOOD-GLUCOSE SENSOR
EACH MISCELLANEOUS
Qty: 2 EACH | Refills: 3 | Status: SHIPPED | OUTPATIENT
Start: 2025-05-27

## 2025-05-27 RX ORDER — PEN NEEDLE, DIABETIC 30 GX3/16"
NEEDLE, DISPOSABLE MISCELLANEOUS
Qty: 200 EACH | Refills: 3 | Status: SHIPPED | OUTPATIENT
Start: 2025-05-27

## 2025-06-09 ENCOUNTER — APPOINTMENT (OUTPATIENT)
Dept: PRIMARY CARE | Facility: CLINIC | Age: 65
End: 2025-06-09
Payer: MEDICARE

## 2025-06-22 ASSESSMENT — ENCOUNTER SYMPTOMS
SWEATS: 0
SPEECH DIFFICULTY: 0
POLYPHAGIA: 0
BLACKOUTS: 0
HUNGER: 0
POLYDIPSIA: 0
BLURRED VISION: 0
HEADACHES: 0
WEIGHT LOSS: 0
DIZZINESS: 0
NERVOUS/ANXIOUS: 0
WEAKNESS: 0
VISUAL CHANGE: 0
FATIGUE: 0
TREMORS: 1
CONFUSION: 0
SEIZURES: 0

## 2025-06-23 ENCOUNTER — APPOINTMENT (OUTPATIENT)
Dept: PRIMARY CARE | Facility: CLINIC | Age: 65
End: 2025-06-23
Payer: MEDICARE

## 2025-06-23 VITALS
WEIGHT: 159.8 LBS | TEMPERATURE: 97 F | DIASTOLIC BLOOD PRESSURE: 80 MMHG | BODY MASS INDEX: 29.23 KG/M2 | HEART RATE: 72 BPM | SYSTOLIC BLOOD PRESSURE: 126 MMHG | OXYGEN SATURATION: 95 %

## 2025-06-23 DIAGNOSIS — E11.649 UNCONTROLLED TYPE 2 DIABETES MELLITUS WITH HYPOGLYCEMIA WITHOUT COMA: ICD-10-CM

## 2025-06-23 DIAGNOSIS — Z79.4 UNCONTROLLED TYPE 2 DIABETES MELLITUS WITH HYPERGLYCEMIA, WITH LONG-TERM CURRENT USE OF INSULIN: ICD-10-CM

## 2025-06-23 DIAGNOSIS — E11.9 TYPE 2 DIABETES MELLITUS WITHOUT COMPLICATION, UNSPECIFIED WHETHER LONG TERM INSULIN USE: ICD-10-CM

## 2025-06-23 DIAGNOSIS — E11.21 DIABETIC NEPHROPATHY ASSOCIATED WITH TYPE 2 DIABETES MELLITUS: ICD-10-CM

## 2025-06-23 DIAGNOSIS — E10.9 TYPE 1 DIABETES MELLITUS WITHOUT COMPLICATION: ICD-10-CM

## 2025-06-23 DIAGNOSIS — E11.65 UNCONTROLLED TYPE 2 DIABETES MELLITUS WITH HYPERGLYCEMIA, WITH LONG-TERM CURRENT USE OF INSULIN: ICD-10-CM

## 2025-06-23 DIAGNOSIS — E78.2 MIXED HYPERLIPIDEMIA: ICD-10-CM

## 2025-06-23 LAB — POC HEMOGLOBIN A1C: 7.2 % (ref 4.2–6.5)

## 2025-06-23 PROCEDURE — 1159F MED LIST DOCD IN RCRD: CPT | Performed by: FAMILY MEDICINE

## 2025-06-23 PROCEDURE — 83036 HEMOGLOBIN GLYCOSYLATED A1C: CPT | Performed by: FAMILY MEDICINE

## 2025-06-23 PROCEDURE — 3051F HG A1C>EQUAL 7.0%<8.0%: CPT | Performed by: FAMILY MEDICINE

## 2025-06-23 PROCEDURE — 99214 OFFICE O/P EST MOD 30 MIN: CPT | Performed by: FAMILY MEDICINE

## 2025-06-23 PROCEDURE — 3079F DIAST BP 80-89 MM HG: CPT | Performed by: FAMILY MEDICINE

## 2025-06-23 PROCEDURE — 3074F SYST BP LT 130 MM HG: CPT | Performed by: FAMILY MEDICINE

## 2025-06-23 NOTE — PROGRESS NOTES
Subjective   Patient ID: Leonie Wilkinson is a 64 y.o. female who presents for Follow-up (3 month) and Diabetes (Last A1c done in March was 7.5/Today in office it is 7.2).  Diabetes  She has type 2 diabetes mellitus. No MedicAlert identification noted. The initial diagnosis of diabetes was made 15 years ago. Hypoglycemia symptoms include tremors. Pertinent negatives for hypoglycemia include no confusion, dizziness, headaches, hunger, mood changes, nervousness/anxiousness, pallor, seizures, sleepiness, speech difficulty or sweats. Associated symptoms include polyuria. Pertinent negatives for diabetes include no blurred vision, no chest pain, no fatigue, no foot paresthesias, no foot ulcerations, no polydipsia, no polyphagia, no visual change, no weakness and no weight loss. Hypoglycemia complications include nocturnal hypoglycemia and required assistance. Pertinent negatives for hypoglycemia complications include no blackouts, no hospitalization and no required glucagon injection. Symptoms are improving. Diabetic complications include retinopathy. Pertinent negatives for diabetic complications include no CVA, heart disease, impotence, nephropathy, peripheral neuropathy or PVD. Risk factors for coronary artery disease include dyslipidemia, hypertension and obesity. Current diabetic treatment includes insulin injections. She is compliant with treatment most of the time. She is currently taking insulin pre-breakfast, pre-lunch and pre-dinner. Insulin injections are given by patient. Rotation sites for injection include the abdominal wall. Her weight is fluctuating minimally. She is following a generally healthy diet. Meal planning includes avoidance of concentrated sweets and carbohydrate counting. She has not had a previous visit with a dietitian. She participates in exercise three times a week. She monitors blood glucose at home 5+ x per day. She monitors urine at home <1 x per month. Blood glucose monitoring  compliance is good. Her home blood glucose trend is fluctuating minimally. Her breakfast blood glucose is taken between 8-9 am. Her breakfast blood glucose range is generally 110-130 mg/dl. Her lunch blood glucose is taken between 1-2 pm. Her lunch blood glucose range is generally 140-180 mg/dl. Her dinner blood glucose is taken between 6-7 pm. Her dinner blood glucose range is generally 130-140 mg/dl. Her bedtime blood glucose is taken between 10-11 pm. Her bedtime blood glucose range is generally 180-200 mg/dl. Her overall blood glucose range is 140-180 mg/dl. She does not see a podiatrist.Eye exam is current.     DOING WELL   Review of Systems   Constitutional:  Negative for activity change, appetite change, fatigue, fever, unexpected weight change and weight loss.   HENT:  Negative for congestion, ear pain, postnasal drip, rhinorrhea, sinus pressure and sore throat.    Eyes:  Negative for blurred vision, discharge, itching and visual disturbance.   Respiratory:  Negative for cough, shortness of breath and wheezing.    Cardiovascular:  Negative for chest pain, palpitations and leg swelling.   Gastrointestinal:  Negative for abdominal pain, blood in stool, constipation, diarrhea, nausea and vomiting.   Endocrine: Positive for polyuria. Negative for cold intolerance, heat intolerance, polydipsia and polyphagia.   Genitourinary:  Negative for dysuria, flank pain, hematuria and impotence.   Musculoskeletal:  Negative for arthralgias, gait problem, joint swelling and myalgias.   Skin:  Negative for pallor and rash.   Allergic/Immunologic: Negative for environmental allergies and food allergies.   Neurological:  Positive for tremors. Negative for dizziness, seizures, syncope, speech difficulty, weakness, light-headedness, numbness and headaches.   Psychiatric/Behavioral:  Negative for confusion, dysphoric mood and sleep disturbance. The patient is not nervous/anxious.            11/13/2023    10:08 AM 4/19/2024      "9:54 AM 8/21/2024     9:48 AM 10/24/2024     9:14 AM 11/27/2024     9:41 AM 3/10/2025    10:03 AM 6/23/2025     9:28 AM   Vitals   Systolic 150 154 128  154 132 126   Diastolic 92 74 84  92 78 80   Heart Rate 68 80 76  60 70 72   Temp 35.7 °C (96.3 °F) 36.1 °C (96.9 °F) 35.7 °C (96.3 °F)  36 °C (96.8 °F) 36.3 °C (97.3 °F) 36.1 °C (97 °F)   Height 1.581 m (5' 2.25\")   1.575 m (5' 2\")      Weight (lb) 163 165 164 160 165 162.4 159.8   BMI 29.57 kg/m2 29.94 kg/m2 29.76 kg/m2 29.26 kg/m2 30.18 kg/m2 29.7 kg/m2 29.23 kg/m2   BSA (m2) 1.8 m2 1.81 m2 1.81 m2 1.78 m2 1.81 m2 1.8 m2 1.78 m2   Visit Report  Report Report  Report Report Report       Body mass index is 29.23 kg/m².      Objective   Physical Exam  Vitals and nursing note reviewed.   Constitutional:       Appearance: Normal appearance.   HENT:      Head: Normocephalic.   Cardiovascular:      Rate and Rhythm: Normal rate and regular rhythm.      Pulses: Normal pulses.      Heart sounds: Normal heart sounds. No murmur heard.     No friction rub. No gallop.   Pulmonary:      Effort: Pulmonary effort is normal. No respiratory distress.      Breath sounds: Normal breath sounds. No wheezing.   Abdominal:      General: There is no distension.      Palpations: Abdomen is soft.      Tenderness: There is no abdominal tenderness.   Musculoskeletal:         General: No deformity. Normal range of motion.   Skin:     General: Skin is warm and dry.      Capillary Refill: Capillary refill takes less than 2 seconds.   Neurological:      General: No focal deficit present.      Mental Status: She is alert and oriented to person, place, and time.   Psychiatric:         Mood and Affect: Mood normal.         Assessment/Plan   Problem List Items Addressed This Visit       Hyperlipidemia    Relevant Orders    Lipid Panel    Uncontrolled type 2 diabetes mellitus with hyperglycemia, with long-term current use of insulin    Diabetic nephropathy associated with type 2 diabetes mellitus    " Uncontrolled type 2 diabetes mellitus with hypoglycemia without coma    Type 2 diabetes mellitus without complication    Relevant Orders    POCT glycosylated hemoglobin (Hb A1C) manually resulted (Completed)    CBC and Auto Differential    Comprehensive Metabolic Panel    Lipid Panel    Hemoglobin A1C     Other Visit Diagnoses         Type 1 diabetes mellitus without complication                   Daysi Davidson DO

## 2025-06-23 NOTE — PATIENT INSTRUCTIONS
SEE IN 6 MONTHS     JOSE WILL GET THE LABS AT THE [facility] PRIOR TO COMING FOR HER [facility]  SHE NEEDS THE [facility] SENSORS REFILLED

## 2025-07-02 ASSESSMENT — ENCOUNTER SYMPTOMS
ABDOMINAL PAIN: 0
FATIGUE: 0
WEAKNESS: 0
SINUS PRESSURE: 0
SHORTNESS OF BREATH: 0
SORE THROAT: 0
SLEEP DISTURBANCE: 0
SWEATS: 0
WEIGHT LOSS: 0
JOINT SWELLING: 0
VISUAL CHANGE: 0
UNEXPECTED WEIGHT CHANGE: 0
WHEEZING: 0
APPETITE CHANGE: 0
PALPITATIONS: 0
DIZZINESS: 0
NERVOUS/ANXIOUS: 0
LIGHT-HEADEDNESS: 0
MYALGIAS: 0
VOMITING: 0
HEMATURIA: 0
BLOOD IN STOOL: 0
SPEECH DIFFICULTY: 0
NAUSEA: 0
NUMBNESS: 0
ACTIVITY CHANGE: 0
DYSURIA: 0
EYE ITCHING: 0
POLYPHAGIA: 0
COUGH: 0
DIARRHEA: 0
ARTHRALGIAS: 0
HEADACHES: 0
BLACKOUTS: 0
BLURRED VISION: 0
RHINORRHEA: 0
FLANK PAIN: 0
FEVER: 0
SEIZURES: 0
HUNGER: 0
CONSTIPATION: 0
EYE DISCHARGE: 0
DYSPHORIC MOOD: 0
TREMORS: 1
POLYDIPSIA: 0
CONFUSION: 0

## 2025-07-09 DIAGNOSIS — I10 PRIMARY HYPERTENSION: ICD-10-CM

## 2025-07-10 RX ORDER — CARVEDILOL 6.25 MG/1
TABLET ORAL
Qty: 180 TABLET | Refills: 3 | Status: SHIPPED | OUTPATIENT
Start: 2025-07-10

## 2025-10-10 ENCOUNTER — APPOINTMENT (OUTPATIENT)
Dept: DERMATOLOGY | Facility: CLINIC | Age: 65
End: 2025-10-10
Payer: MEDICARE

## 2025-12-23 ENCOUNTER — APPOINTMENT (OUTPATIENT)
Dept: PRIMARY CARE | Facility: CLINIC | Age: 65
End: 2025-12-23
Payer: MEDICARE